# Patient Record
Sex: FEMALE | Race: BLACK OR AFRICAN AMERICAN | NOT HISPANIC OR LATINO | URBAN - METROPOLITAN AREA
[De-identification: names, ages, dates, MRNs, and addresses within clinical notes are randomized per-mention and may not be internally consistent; named-entity substitution may affect disease eponyms.]

---

## 2017-07-24 ENCOUNTER — EMERGENCY (EMERGENCY)
Facility: HOSPITAL | Age: 42
LOS: 1 days | Discharge: HOME | End: 2017-07-24
Attending: EMERGENCY MEDICINE | Admitting: EMERGENCY MEDICINE
Payer: COMMERCIAL

## 2017-07-24 VITALS
OXYGEN SATURATION: 98 % | SYSTOLIC BLOOD PRESSURE: 129 MMHG | HEART RATE: 101 BPM | TEMPERATURE: 98 F | RESPIRATION RATE: 16 BRPM | DIASTOLIC BLOOD PRESSURE: 82 MMHG

## 2017-07-24 PROCEDURE — 10060 I&D ABSCESS SIMPLE/SINGLE: CPT

## 2017-07-24 PROCEDURE — 87070 CULTURE OTHR SPECIMN AEROBIC: CPT

## 2017-07-24 PROCEDURE — 87186 SC STD MICRODIL/AGAR DIL: CPT

## 2017-07-24 PROCEDURE — 99283 EMERGENCY DEPT VISIT LOW MDM: CPT | Mod: 25

## 2017-07-24 RX ORDER — CEPHALEXIN 500 MG
1 CAPSULE ORAL
Qty: 14 | Refills: 0 | OUTPATIENT
Start: 2017-07-24 | End: 2017-07-31

## 2017-07-24 NOTE — ED PROVIDER NOTE - CARE PLAN
Principal Discharge DX:	Abscess  Instructions for follow-up, activity and diet:	Follow up with your Primary Care Doctor within the next 2 days.  Keep covered and dry.  Take Keflex 500mg twice daily for 7 days.  Any increased pain, redness, fever, chills return to ED

## 2017-07-24 NOTE — ED PROVIDER NOTE - ATTENDING CONTRIBUTION TO CARE
42 F w anemia, diabetes mellitus presents w complaint of right mid back possible abscess and tenderness for the past 3-4 days. abscess noted on us 1x1 cm with induration to I and d and d.c home with abx.

## 2017-07-24 NOTE — ED ADULT NURSE NOTE - OBJECTIVE STATEMENT
41 yo F presents to ED A+Ox3 c/o pain and wound to right lower back. States she "thought it was a bug bite."  Small red area with purulent area noted to right lower back. No active drainage noted. Denies fever, chills, cough. Breathing unlabored on RA.

## 2017-07-24 NOTE — ED PROVIDER NOTE - SKIN COLOR
right midback 3 cm x 4cm firm area of induration  with 2mm central pustule.  the area is hot and tender

## 2017-07-24 NOTE — ED PROVIDER NOTE - OBJECTIVE STATEMENT
42 F w anemia, diabetes mellitus presents w complaint of right mid back possible abscess and tenderness for the past 3-4 days. She has not been able to see the spot on her back but suspects it is an abscess. She denies bug bite, or trauma to the area prior to noticing it. She denies fever, chills or drainage from the site.

## 2017-07-24 NOTE — ED PROVIDER NOTE - PLAN OF CARE
Follow up with your Primary Care Doctor within the next 2 days.  Keep covered and dry.  Take Keflex 500mg twice daily for 7 days.  Any increased pain, redness, fever, chills return to ED

## 2017-07-24 NOTE — ED ADULT NURSE NOTE - PSH
C section  2008  No significant past surgical history    S/P laparoscopic procedure  D&C Hysteroscopy, Laparoscopy, RSO/ Excision Right Tubo-ovarian abscess 7/19

## 2017-07-26 LAB
-  AMPICILLIN/SULBACTAM: SIGNIFICANT CHANGE UP
-  CEFAZOLIN: SIGNIFICANT CHANGE UP
-  CIPROFLOXACIN: SIGNIFICANT CHANGE UP
-  CLINDAMYCIN: SIGNIFICANT CHANGE UP
-  DAPTOMYCIN: SIGNIFICANT CHANGE UP
-  ERYTHROMYCIN: SIGNIFICANT CHANGE UP
-  GENTAMICIN: SIGNIFICANT CHANGE UP
-  LEVOFLOXACIN: SIGNIFICANT CHANGE UP
-  LINEZOLID: SIGNIFICANT CHANGE UP
-  MOXIFLOXACIN(AEROBIC): SIGNIFICANT CHANGE UP
-  OXACILLIN: SIGNIFICANT CHANGE UP
-  PENICILLIN: SIGNIFICANT CHANGE UP
-  RIFAMPIN: SIGNIFICANT CHANGE UP
-  TETRACYCLINE: SIGNIFICANT CHANGE UP
-  TRIMETHOPRIM/SULFAMETHOXAZOLE: SIGNIFICANT CHANGE UP
-  VANCOMYCIN: SIGNIFICANT CHANGE UP
CULTURE RESULTS: SIGNIFICANT CHANGE UP
METHOD TYPE: SIGNIFICANT CHANGE UP
ORGANISM # SPEC MICROSCOPIC CNT: SIGNIFICANT CHANGE UP
ORGANISM # SPEC MICROSCOPIC CNT: SIGNIFICANT CHANGE UP
SPECIMEN SOURCE: SIGNIFICANT CHANGE UP

## 2017-10-21 ENCOUNTER — INPATIENT (INPATIENT)
Facility: HOSPITAL | Age: 42
LOS: 5 days | Discharge: HOME | DRG: 871 | End: 2017-10-27
Attending: INTERNAL MEDICINE | Admitting: SPECIALIST
Payer: COMMERCIAL

## 2017-10-21 VITALS
SYSTOLIC BLOOD PRESSURE: 155 MMHG | HEIGHT: 62 IN | OXYGEN SATURATION: 94 % | DIASTOLIC BLOOD PRESSURE: 79 MMHG | HEART RATE: 131 BPM | RESPIRATION RATE: 28 BRPM | WEIGHT: 173.94 LBS

## 2017-10-21 DIAGNOSIS — E13.10 OTHER SPECIFIED DIABETES MELLITUS WITH KETOACIDOSIS WITHOUT COMA: ICD-10-CM

## 2017-10-21 DIAGNOSIS — E87.8 OTHER DISORDERS OF ELECTROLYTE AND FLUID BALANCE, NOT ELSEWHERE CLASSIFIED: ICD-10-CM

## 2017-10-21 DIAGNOSIS — I82.409 ACUTE EMBOLISM AND THROMBOSIS OF UNSPECIFIED DEEP VEINS OF UNSPECIFIED LOWER EXTREMITY: ICD-10-CM

## 2017-10-21 DIAGNOSIS — R10.9 UNSPECIFIED ABDOMINAL PAIN: ICD-10-CM

## 2017-10-21 DIAGNOSIS — I24.9 ACUTE ISCHEMIC HEART DISEASE, UNSPECIFIED: ICD-10-CM

## 2017-10-21 LAB
ACETONE SERPL-MCNC: ABNORMAL
ACETONE SERPL-MCNC: ABNORMAL
ALBUMIN SERPL ELPH-MCNC: 3.9 G/DL — SIGNIFICANT CHANGE UP (ref 3.3–5)
ALP SERPL-CCNC: 172 U/L — HIGH (ref 40–120)
ALT FLD-CCNC: 16 U/L RC — SIGNIFICANT CHANGE UP (ref 10–45)
AMPHET UR-MCNC: NEGATIVE — SIGNIFICANT CHANGE UP
ANION GAP SERPL CALC-SCNC: 26 MMOL/L — HIGH (ref 5–17)
ANION GAP SERPL CALC-SCNC: 31 MMOL/L — HIGH (ref 5–17)
APPEARANCE UR: CLEAR — SIGNIFICANT CHANGE UP
APTT BLD: 34.3 SEC — SIGNIFICANT CHANGE UP (ref 27.5–37.4)
AST SERPL-CCNC: 14 U/L — SIGNIFICANT CHANGE UP (ref 10–40)
B-OH-BUTYR SERPL-SCNC: 7 MMOL/L — HIGH
BARBITURATES UR SCN-MCNC: NEGATIVE — SIGNIFICANT CHANGE UP
BASE EXCESS BLDV CALC-SCNC: -26.1 MMOL/L — LOW (ref -2–2)
BASE EXCESS BLDV CALC-SCNC: -27.7 MMOL/L — LOW (ref -2–2)
BASOPHILS # BLD AUTO: 0 K/UL — SIGNIFICANT CHANGE UP (ref 0–0.2)
BENZODIAZ UR-MCNC: NEGATIVE — SIGNIFICANT CHANGE UP
BILIRUB SERPL-MCNC: 0.2 MG/DL — SIGNIFICANT CHANGE UP (ref 0.2–1.2)
BILIRUB UR-MCNC: NEGATIVE — SIGNIFICANT CHANGE UP
BUN SERPL-MCNC: 16 MG/DL — SIGNIFICANT CHANGE UP (ref 7–23)
BUN SERPL-MCNC: 17 MG/DL — SIGNIFICANT CHANGE UP (ref 7–23)
CA-I SERPL-SCNC: 1.32 MMOL/L — HIGH (ref 1.12–1.3)
CA-I SERPL-SCNC: 1.33 MMOL/L — HIGH (ref 1.12–1.3)
CALCIUM SERPL-MCNC: 8.6 MG/DL — SIGNIFICANT CHANGE UP (ref 8.4–10.5)
CALCIUM SERPL-MCNC: 9.2 MG/DL — SIGNIFICANT CHANGE UP (ref 8.4–10.5)
CHLORIDE BLDV-SCNC: 101 MMOL/L — SIGNIFICANT CHANGE UP (ref 96–108)
CHLORIDE BLDV-SCNC: 110 MMOL/L — HIGH (ref 96–108)
CHLORIDE SERPL-SCNC: 101 MMOL/L — SIGNIFICANT CHANGE UP (ref 96–108)
CHLORIDE SERPL-SCNC: 86 MMOL/L — LOW (ref 96–108)
CK MB BLD-MCNC: 2.8 % — SIGNIFICANT CHANGE UP (ref 0–3.5)
CK MB CFR SERPL CALC: 1.5 NG/ML — SIGNIFICANT CHANGE UP (ref 0–3.8)
CK SERPL-CCNC: 54 U/L — SIGNIFICANT CHANGE UP (ref 25–170)
CO2 BLDV-SCNC: 5 MMOL/L — LOW (ref 22–30)
CO2 BLDV-SCNC: 7 MMOL/L — LOW (ref 22–30)
CO2 SERPL-SCNC: 4 MMOL/L — CRITICAL LOW (ref 22–31)
CO2 SERPL-SCNC: 5 MMOL/L — CRITICAL LOW (ref 22–31)
COCAINE METAB.OTHER UR-MCNC: NEGATIVE — SIGNIFICANT CHANGE UP
COLOR SPEC: COLORLESS — SIGNIFICANT CHANGE UP
CREAT SERPL-MCNC: 1.03 MG/DL — SIGNIFICANT CHANGE UP (ref 0.5–1.3)
CREAT SERPL-MCNC: 1.27 MG/DL — SIGNIFICANT CHANGE UP (ref 0.5–1.3)
DIFF PNL FLD: ABNORMAL
EOSINOPHIL # BLD AUTO: 0.1 K/UL — SIGNIFICANT CHANGE UP (ref 0–0.5)
EOSINOPHIL NFR BLD AUTO: 1 % — SIGNIFICANT CHANGE UP (ref 0–6)
ETHANOL SERPL-MCNC: SIGNIFICANT CHANGE UP MG/DL (ref 0–10)
GAS PNL BLDV: 124 MMOL/L — LOW (ref 136–145)
GAS PNL BLDV: 134 MMOL/L — LOW (ref 136–145)
GAS PNL BLDV: SIGNIFICANT CHANGE UP
GIANT PLATELETS BLD QL SMEAR: PRESENT — SIGNIFICANT CHANGE UP
GLUCOSE BLDC GLUCOMTR-MCNC: 267 MG/DL — HIGH (ref 70–99)
GLUCOSE BLDC GLUCOMTR-MCNC: 367 MG/DL — HIGH (ref 70–99)
GLUCOSE BLDC GLUCOMTR-MCNC: 486 MG/DL — CRITICAL HIGH (ref 70–99)
GLUCOSE BLDC GLUCOMTR-MCNC: >600 MG/DL — CRITICAL HIGH (ref 70–99)
GLUCOSE BLDV-MCNC: 655 MG/DL — CRITICAL HIGH (ref 70–99)
GLUCOSE BLDV-MCNC: 977 MG/DL — CRITICAL HIGH (ref 70–99)
GLUCOSE SERPL-MCNC: 712 MG/DL — CRITICAL HIGH (ref 70–99)
GLUCOSE SERPL-MCNC: 987 MG/DL — CRITICAL HIGH (ref 70–99)
GLUCOSE UR QL: >1000
HCG UR QL: NEGATIVE — SIGNIFICANT CHANGE UP
HCO3 BLDV-SCNC: 4 MMOL/L — LOW (ref 21–29)
HCO3 BLDV-SCNC: 6 MMOL/L — LOW (ref 21–29)
HCT VFR BLD CALC: 39 % — SIGNIFICANT CHANGE UP (ref 34.5–45)
HCT VFR BLD CALC: 45.4 % — HIGH (ref 34.5–45)
HCT VFR BLDA CALC: 36 % — LOW (ref 39–50)
HCT VFR BLDA CALC: 40 % — SIGNIFICANT CHANGE UP (ref 39–50)
HGB BLD CALC-MCNC: 11.7 G/DL — SIGNIFICANT CHANGE UP (ref 11.5–15.5)
HGB BLD CALC-MCNC: 12.9 G/DL — SIGNIFICANT CHANGE UP (ref 11.5–15.5)
HGB BLD-MCNC: 11.9 G/DL — SIGNIFICANT CHANGE UP (ref 11.5–15.5)
HGB BLD-MCNC: 13 G/DL — SIGNIFICANT CHANGE UP (ref 11.5–15.5)
INR BLD: 1.2 RATIO — HIGH (ref 0.88–1.16)
KETONES UR-MCNC: ABNORMAL
LACTATE BLDV-MCNC: 2.2 MMOL/L — HIGH (ref 0.7–2)
LACTATE BLDV-MCNC: 2.9 MMOL/L — HIGH (ref 0.7–2)
LEUKOCYTE ESTERASE UR-ACNC: NEGATIVE — SIGNIFICANT CHANGE UP
LYMPHOCYTES # BLD AUTO: 1 K/UL — SIGNIFICANT CHANGE UP (ref 1–3.3)
LYMPHOCYTES # BLD AUTO: 3 % — LOW (ref 13–44)
MAGNESIUM SERPL-MCNC: 2.2 MG/DL — SIGNIFICANT CHANGE UP (ref 1.6–2.6)
MCHC RBC-ENTMCNC: 27.9 PG — SIGNIFICANT CHANGE UP (ref 27–34)
MCHC RBC-ENTMCNC: 28.5 PG — SIGNIFICANT CHANGE UP (ref 27–34)
MCHC RBC-ENTMCNC: 28.7 GM/DL — LOW (ref 32–36)
MCHC RBC-ENTMCNC: 30.5 GM/DL — LOW (ref 32–36)
MCV RBC AUTO: 93.5 FL — SIGNIFICANT CHANGE UP (ref 80–100)
MCV RBC AUTO: 97.1 FL — SIGNIFICANT CHANGE UP (ref 80–100)
METHADONE UR-MCNC: NEGATIVE — SIGNIFICANT CHANGE UP
MONOCYTES # BLD AUTO: 1.5 K/UL — HIGH (ref 0–0.9)
MONOCYTES NFR BLD AUTO: 6 % — SIGNIFICANT CHANGE UP (ref 2–14)
MYELOCYTES NFR BLD: 1 % — HIGH (ref 0–0)
NEUTROPHILS # BLD AUTO: 20.7 K/UL — HIGH (ref 1.8–7.4)
NEUTROPHILS NFR BLD AUTO: 86 % — HIGH (ref 43–77)
NEUTS BAND # BLD: 3 % — SIGNIFICANT CHANGE UP (ref 0–8)
NITRITE UR-MCNC: NEGATIVE — SIGNIFICANT CHANGE UP
OPIATES UR-MCNC: NEGATIVE — SIGNIFICANT CHANGE UP
OTHER CELLS CSF MANUAL: 13 ML/DL — LOW (ref 18–22)
OTHER CELLS CSF MANUAL: 4 ML/DL — LOW (ref 18–22)
OXYCODONE UR-MCNC: NEGATIVE — SIGNIFICANT CHANGE UP
PCO2 BLDV: 23 MMHG — LOW (ref 35–50)
PCO2 BLDV: 32 MMHG — LOW (ref 35–50)
PCP SPEC-MCNC: SIGNIFICANT CHANGE UP
PCP UR-MCNC: NEGATIVE — SIGNIFICANT CHANGE UP
PH BLDV: 6.91 — CRITICAL LOW (ref 7.35–7.45)
PH BLDV: 6.93 — CRITICAL LOW (ref 7.35–7.45)
PH UR: 5.5 — SIGNIFICANT CHANGE UP (ref 5–8)
PHOSPHATE SERPL-MCNC: 1.8 MG/DL — LOW (ref 2.5–4.5)
PLAT MORPH BLD: ABNORMAL
PLATELET # BLD AUTO: 347 K/UL — SIGNIFICANT CHANGE UP (ref 150–400)
PLATELET # BLD AUTO: 361 K/UL — SIGNIFICANT CHANGE UP (ref 150–400)
PO2 BLDV: 19 MMHG — LOW (ref 25–45)
PO2 BLDV: 48 MMHG — HIGH (ref 25–45)
POTASSIUM BLDV-SCNC: 4.2 MMOL/L — SIGNIFICANT CHANGE UP (ref 3.5–5)
POTASSIUM BLDV-SCNC: 5.6 MMOL/L — HIGH (ref 3.5–5)
POTASSIUM SERPL-MCNC: 4.3 MMOL/L — SIGNIFICANT CHANGE UP (ref 3.5–5.3)
POTASSIUM SERPL-MCNC: 5.7 MMOL/L — HIGH (ref 3.5–5.3)
POTASSIUM SERPL-SCNC: 4.3 MMOL/L — SIGNIFICANT CHANGE UP (ref 3.5–5.3)
POTASSIUM SERPL-SCNC: 5.7 MMOL/L — HIGH (ref 3.5–5.3)
PROCALCITONIN SERPL-MCNC: 3.4 NG/ML — HIGH (ref 0–0.04)
PROT SERPL-MCNC: 9.4 G/DL — HIGH (ref 6–8.3)
PROT UR-MCNC: 30 MG/DL
PROTHROM AB SERPL-ACNC: 13 SEC — HIGH (ref 9.8–12.7)
RBC # BLD: 4.18 M/UL — SIGNIFICANT CHANGE UP (ref 3.8–5.2)
RBC # BLD: 4.67 M/UL — SIGNIFICANT CHANGE UP (ref 3.8–5.2)
RBC # FLD: 12 % — SIGNIFICANT CHANGE UP (ref 10.3–14.5)
RBC # FLD: 12.2 % — SIGNIFICANT CHANGE UP (ref 10.3–14.5)
RBC BLD AUTO: SIGNIFICANT CHANGE UP
RBC CASTS # UR COMP ASSIST: SIGNIFICANT CHANGE UP /HPF (ref 0–2)
SAO2 % BLDV: 24 % — LOW (ref 67–88)
SAO2 % BLDV: 73 % — SIGNIFICANT CHANGE UP (ref 67–88)
SODIUM SERPL-SCNC: 121 MMOL/L — LOW (ref 135–145)
SODIUM SERPL-SCNC: 132 MMOL/L — LOW (ref 135–145)
SP GR SPEC: 1.02 — SIGNIFICANT CHANGE UP (ref 1.01–1.02)
THC UR QL: NEGATIVE — SIGNIFICANT CHANGE UP
TROPONIN T SERPL-MCNC: <0.01 NG/ML — SIGNIFICANT CHANGE UP (ref 0–0.06)
UROBILINOGEN FLD QL: NEGATIVE — SIGNIFICANT CHANGE UP
WBC # BLD: 23.4 K/UL — HIGH (ref 3.8–10.5)
WBC # BLD: 26.8 K/UL — HIGH (ref 3.8–10.5)
WBC # FLD AUTO: 23.4 K/UL — HIGH (ref 3.8–10.5)
WBC # FLD AUTO: 26.8 K/UL — HIGH (ref 3.8–10.5)
WBC UR QL: SIGNIFICANT CHANGE UP /HPF (ref 0–5)

## 2017-10-21 PROCEDURE — 99291 CRITICAL CARE FIRST HOUR: CPT

## 2017-10-21 PROCEDURE — 93010 ELECTROCARDIOGRAM REPORT: CPT

## 2017-10-21 PROCEDURE — 71010: CPT | Mod: 26

## 2017-10-21 PROCEDURE — 99291 CRITICAL CARE FIRST HOUR: CPT | Mod: 25

## 2017-10-21 PROCEDURE — 74000: CPT | Mod: 26

## 2017-10-21 RX ORDER — ONDANSETRON 8 MG/1
4 TABLET, FILM COATED ORAL ONCE
Qty: 0 | Refills: 0 | Status: COMPLETED | OUTPATIENT
Start: 2017-10-21 | End: 2017-10-21

## 2017-10-21 RX ORDER — INSULIN HUMAN 100 [IU]/ML
8 INJECTION, SOLUTION SUBCUTANEOUS ONCE
Qty: 0 | Refills: 0 | Status: COMPLETED | OUTPATIENT
Start: 2017-10-21 | End: 2017-10-21

## 2017-10-21 RX ORDER — POLYETHYLENE GLYCOL 3350 17 G/17G
17 POWDER, FOR SOLUTION ORAL DAILY
Qty: 0 | Refills: 0 | Status: DISCONTINUED | OUTPATIENT
Start: 2017-10-21 | End: 2017-10-27

## 2017-10-21 RX ORDER — INSULIN HUMAN 100 [IU]/ML
4 INJECTION, SOLUTION SUBCUTANEOUS
Qty: 100 | Refills: 0 | Status: DISCONTINUED | OUTPATIENT
Start: 2017-10-21 | End: 2017-10-22

## 2017-10-21 RX ORDER — INSULIN HUMAN 100 [IU]/ML
10 INJECTION, SOLUTION SUBCUTANEOUS ONCE
Qty: 0 | Refills: 0 | Status: COMPLETED | OUTPATIENT
Start: 2017-10-21 | End: 2017-10-21

## 2017-10-21 RX ORDER — SODIUM CHLORIDE 9 MG/ML
1000 INJECTION INTRAMUSCULAR; INTRAVENOUS; SUBCUTANEOUS
Qty: 0 | Refills: 0 | Status: DISCONTINUED | OUTPATIENT
Start: 2017-10-21 | End: 2017-10-22

## 2017-10-21 RX ORDER — SODIUM CHLORIDE 9 MG/ML
1000 INJECTION, SOLUTION INTRAVENOUS
Qty: 0 | Refills: 0 | Status: COMPLETED | OUTPATIENT
Start: 2017-10-21 | End: 2017-10-21

## 2017-10-21 RX ORDER — SENNA PLUS 8.6 MG/1
2 TABLET ORAL AT BEDTIME
Qty: 0 | Refills: 0 | Status: DISCONTINUED | OUTPATIENT
Start: 2017-10-21 | End: 2017-10-27

## 2017-10-21 RX ORDER — SODIUM CHLORIDE 9 MG/ML
2000 INJECTION INTRAMUSCULAR; INTRAVENOUS; SUBCUTANEOUS ONCE
Qty: 0 | Refills: 0 | Status: COMPLETED | OUTPATIENT
Start: 2017-10-21 | End: 2017-10-21

## 2017-10-21 RX ORDER — PANTOPRAZOLE SODIUM 20 MG/1
40 TABLET, DELAYED RELEASE ORAL DAILY
Qty: 0 | Refills: 0 | Status: DISCONTINUED | OUTPATIENT
Start: 2017-10-21 | End: 2017-10-22

## 2017-10-21 RX ORDER — HEPARIN SODIUM 5000 [USP'U]/ML
5000 INJECTION INTRAVENOUS; SUBCUTANEOUS EVERY 8 HOURS
Qty: 0 | Refills: 0 | Status: DISCONTINUED | OUTPATIENT
Start: 2017-10-21 | End: 2017-10-27

## 2017-10-21 RX ORDER — SIMETHICONE 80 MG/1
80 TABLET, CHEWABLE ORAL EVERY 4 HOURS
Qty: 0 | Refills: 0 | Status: DISCONTINUED | OUTPATIENT
Start: 2017-10-21 | End: 2017-10-27

## 2017-10-21 RX ADMIN — Medication 255 MILLIMOLE(S): at 23:55

## 2017-10-21 RX ADMIN — INSULIN HUMAN 10 UNIT(S): 100 INJECTION, SOLUTION SUBCUTANEOUS at 21:00

## 2017-10-21 RX ADMIN — INSULIN HUMAN 8 UNIT(S)/HR: 100 INJECTION, SOLUTION SUBCUTANEOUS at 19:57

## 2017-10-21 RX ADMIN — ONDANSETRON 4 MILLIGRAM(S): 8 TABLET, FILM COATED ORAL at 19:09

## 2017-10-21 RX ADMIN — INSULIN HUMAN 8 UNIT(S): 100 INJECTION, SOLUTION SUBCUTANEOUS at 19:37

## 2017-10-21 RX ADMIN — SODIUM CHLORIDE 500 MILLILITER(S): 9 INJECTION INTRAMUSCULAR; INTRAVENOUS; SUBCUTANEOUS at 20:02

## 2017-10-21 RX ADMIN — SODIUM CHLORIDE 1000 MILLILITER(S): 9 INJECTION INTRAMUSCULAR; INTRAVENOUS; SUBCUTANEOUS at 19:04

## 2017-10-21 RX ADMIN — SENNA PLUS 2 TABLET(S): 8.6 TABLET ORAL at 23:59

## 2017-10-21 RX ADMIN — SODIUM CHLORIDE 500 MILLILITER(S): 9 INJECTION INTRAMUSCULAR; INTRAVENOUS; SUBCUTANEOUS at 23:57

## 2017-10-21 RX ADMIN — INSULIN HUMAN 10 UNIT(S)/HR: 100 INJECTION, SOLUTION SUBCUTANEOUS at 23:55

## 2017-10-21 RX ADMIN — SODIUM CHLORIDE 125 MILLILITER(S): 9 INJECTION, SOLUTION INTRAVENOUS at 22:45

## 2017-10-21 NOTE — H&P ADULT - NEUROLOGICAL
Supine upright views of the abdomen:



Abdominal pain.



There is no unremarkable distribution of bowel gas. In the upright 

position a couple of scattered air-fluid levels are identified in the 

mid and lower abdomen. No free air. No soft tissue mass. No abnormal 

soft tissue calcifications. Of incidental note appears to be slight 

narrowing of the superior left acetabular joint space with articular 

sclerosis of the acetabulum and small lateral spur.



Impressions:



1. Nonspecific minimal small bowel ileus.



2. Degenerative left hip changes. details… detailed exam

## 2017-10-21 NOTE — ED ADULT NURSE NOTE - HARM RISK FACTORS
[address] [facility] [address] 
[phone] Work/School Note Date: 9/21/2017 To Whom It May concern: 
 
Ame Ahn was seen and treated today in the emergency room by the following provider(s): 
Attending Provider: Destin Hampton MD 
Physician Assistant: Pedro Cheema. Zachary Pinedo may return to work on 09/25/17. Sincerely, Pedro Cheema 
 
 
 
 no

## 2017-10-21 NOTE — ED ADULT NURSE NOTE - PRIMARY CARE PROVIDER
Problem: Goal Outcome Summary  Goal: Goal Outcome Summary  Outcome: Improving  Tolerating clear liquids well, small amount of liquid stool and gas in ostomy bag this am. Up in zhang with SBA. PCA Dilaudid effective for pain.       unk

## 2017-10-21 NOTE — ED PROVIDER NOTE - OBJECTIVE STATEMENT
42 y.o. female hx of DM on insulin, lantas and humalog pw SOB worsening over past couple of days. Pt states that she just got off a plane from [place] and while there was not monitoring her blood glucose and is not taking insulin completely as prescribed. Also endorses chest discomfort radiating to back. +generalized malaise and weakness. No fevers, chills or recent illness.

## 2017-10-21 NOTE — ED ADULT NURSE REASSESSMENT NOTE - NS ED NURSE REASSESS COMMENT FT1
Report received from LAWRENCE Alejandra.  Patient awake and alert and oriented x 4.  Family at the bedside.  Patient c/o nausea and had some vomiting, but not actively vomiting at this time.  Patient and family aware of plan of care.  Safety ensured.
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
## 2017-10-21 NOTE — ED PROVIDER NOTE - MEDICAL DECISION MAKING DETAILS
42 y.o. female pw likely DKA. FS >600. Likely due to poor compliance with meds. Will check labs, fluids, likely insulin gtt and ICU admit.
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
## 2017-10-21 NOTE — ED PROVIDER NOTE - INTERPRETATION
EKG reviewed for rate, rhythm, axis, intervals and segments, including QRS morphology, P wave appearance T wave appearance, KS interval, and QT interval.  I find the EKG to be unremarkable in all of these regards except as follows: sinus tachycardia

## 2017-10-21 NOTE — H&P ADULT - PROBLEM SELECTOR PLAN 1
DKA in setting of her non compliance with her insulin regimen . will also r/o other causes such as infectious process   Admit to MICU   c/w insulin gtt   c/w FS q 1 hr   c/w  BMP, MG, Phos, VBG w/ lytes , lactate, acetones q4 hrs  check procalcitonin, PAN cx, UA, legionella  vital signs monitoring   endocrine consult DKA in setting of her non compliance with her insulin regimen . will also r/o other causes such as infectious process , toxicology source   Admit to MICU   c/w insulin gtt   c/w FS q 1 hr   c/w  BMP, MG, Phos, VBG w/ lytes , lactate, acetones q4 hrs  CXR  check procalcitonin, PAN cx, UA, legionella, urine tox , ETOH level   IVF hydration   vital signs monitoring   endocrine consult  NPO   strict I&o's DKA in setting of her non compliance with her insulin regimen . will also r/o other causes such as infectious process , toxicology source   Admit to MICU   c/w insulin gtt   c/w FS q 1 hr   c/w  BMP, MG, Phos, VBG w/ lytes , lactate, acetones q4 hrs  CXR  check procalcitonin, PAN cx, UA, legionella, urine tox , ETOH level , Hgba1c, lipid   IVF hydration   vital signs monitoring   endocrine consult  NPO   strict I&o's

## 2017-10-21 NOTE — ED PROVIDER NOTE - NS ED ROS FT
ROS: denies HA, dizziness, fevers/chills, nausea/vomiting,  diaphoresis, abdominal pain, back/neck pain, dysuria/hematuria, or rash  +SOB, weakness, chest pain

## 2017-10-21 NOTE — H&P ADULT - NEGATIVE CARDIOVASCULAR SYMPTOMS
no peripheral edema/no dyspnea on exertion/no palpitations/no paroxysmal nocturnal dyspnea/no claudication/no orthopnea

## 2017-10-21 NOTE — H&P ADULT - ASSESSMENT
43 y/o F with DMT2 p/ to ED with SOB , chest pressure found to be in DKA in setting of noncompliance with insulin regimen. Will also r/o other causes such as infectious process, ACLS, DVT. 43 y/o F with DMT2 p/ to ED with SOB , chest pressure found to be in DKA in setting of noncompliance with insulin regimen. Will also r/o other causes such as infectious process, ACLS.

## 2017-10-21 NOTE — H&P ADULT - PROBLEM SELECTOR PLAN 2
chest pressure radiating to back   trend CE  EKG   telemetry monitoring SOB & chest pressure radiating to back ; r/o ACS,  electrolytes imbalance   trend CE  EKG   telemetry monitoring  correct hyperkalemia / electrolyte imbalance  supplemental O2 PRN to maintain o2sat >94% SOB & chest pressure radiating to back ; r/o ACS,  electrolytes imbalance   trend CE  EKG   telemetry monitoring  correct hyperkalemia / electrolyte imbalance  supplemental O2 PRN to maintain o2sat >94%  obtain VA duplex  TTE

## 2017-10-21 NOTE — ED PROVIDER NOTE - ATTENDING CONTRIBUTION TO CARE
41 yo female, insulin-dependant diabetic, several days of lethargic, nausea, frequent urination, shortness of breath.  Hasn't checked sugars or taken insulin in several days, recently returned from [place].  Kussmaul respirations on exam, BS critical high, patient in DKA.  IV access, multiple liters of NS, pending labs, will begin insulin infusion, MICU consult.

## 2017-10-21 NOTE — ED PROVIDER NOTE - PHYSICAL EXAMINATION
Gen: Ill appearing, resp distress with Kussmaul breathing pattern, AOx3  Head: NCAT  HEENT: PERRL, MM dry, normal conjunctiva  Lung: CTAB, no rales, rhonchi or wheezing  CV: S1/S2, no murmurs, rubs or gallops  Abd: soft, NTND, no rebound or guarding  MSK: No CVA tenderness. No edema, no visible deformities  Neuro: No focal neurologic deficits. CN II-XII intact. Normal strength and sensation x 4  Skin: Warm and dry, no evidence of rash  Psych: normal mood and affect

## 2017-10-21 NOTE — ED ADULT NURSE NOTE - OBJECTIVE STATEMENT
42 y.o female c c/o SOB/ and not feeling well since Thursday. Family states they were away in [place] on vacation. On Thursday pt wasn't feeling well and was having episodes of confusion. Pt wasn't doing FS. Pt is on insulin. Dry mucous membranes. Unable to obtain FS- machine reading "high". VBG drawn/sent. Pt was drinking a lot of juice in [facility]. Tachycardic to 120bpm range. Pt has been vomiting for past 2days. Pt took a laxative but wasn't able to produce a bowel movement. IV access obtained. Pt receiving fluids.

## 2017-10-21 NOTE — H&P ADULT - NSHPSOCIALHISTORY_GEN_ALL_CORE
Martial ;   Living :   Tobacco:  Alcohol:  Drug use:  Sexual history; Martial ;    Living : spouse and kids   Tobacco: denies  Alcohol: denies  Drug use: denies

## 2017-10-21 NOTE — H&P ADULT - HISTORY OF PRESENT ILLNESS
42 y.o. female hx of DMT2 on Insulin on Lantus and Humalog, back abscess with MRSA 7/2017,   p/w SOB worsening over past couple of days. Pt states that she just got off a plane from [place] and while there was not monitoring her blood glucose and is not taking insulin completely as prescribed. Also endorses chest discomfort radiating to back.   +generalized malaise, fatigue and weakness. No fevers, chills or recent illness.  ICU Vital Signs Last 24 Hrs  T(C): 36.4   HR;118 - 131  BP: 147/80 - 172/96  RR: 18 - 28  SpO2: 94% - 100%                          13.0   23.4  )-----------( 361      ( 21 Oct 2017 18:39 )             45.4   10-21    121<L>  |  86<L>  |  17  ----------------------------<  987<HH>  5.7<H>   |  4<LL>  |  1.27    Ca    9.2      21 Oct 2017 18:39  Phos  3.4     10-21  Mg     2.5     10-21    TPro  9.4<H>  /  Alb  3.9  /  TBili  0.2  /  DBili  x   /  AST  14  /  ALT  16  /  AlkPhos  172<H>  10-21    PT/INR - ( 21 Oct 2017 18:39 )   PT: 13.0 sec;   INR: 1.20 ratio      PTT - ( 21 Oct 2017 18:39 )  PTT:34.3 sec 42 y.o. female hx of DMT2 on Insulin on Lantus and Humalog, back abscess with MRSA 7/2017,   p/w SOB worsening over past couple of days. Pt states that she just got off a plane from [place] and while there was not monitoring her blood glucose and is not taking insulin completely as prescribed.   Also endorses chest discomfort radiating to back, generalized malaise, fatigue and weakness. No fevers, chills or recent illness.  ICU Vital Signs Last 24 Hrs  T(C): 36.4   HR;118 - 131  BP: 147/80 - 172/96  RR: 18 - 28  SpO2: 94% - 100%                          13.0   23.4  )-----------( 361      ( 21 Oct 2017 18:39 )             45.4   10-21    121<L>  |  86<L>  |  17  ----------------------------<  987<HH>  5.7<H>   |  4<LL>  |  1.27    Ca    9.2      21 Oct 2017 18:39  Phos  3.4     10-21  Mg     2.5     10-21    TPro  9.4<H>  /  Alb  3.9  /  TBili  0.2  /  DBili  x   /  AST  14  /  ALT  16  /  AlkPhos  172<H>  10-21    PT/INR - ( 21 Oct 2017 18:39 )   PT: 13.0 sec;   INR: 1.20 ratio      PTT - ( 21 Oct 2017 18:39 )  PTT:34.3 sec 42 y.o. female hx of DMT2 on Insulin on Lantus and Humalog, back abscess with MRSA 7/2017,   p/w SOB worsening over past couple of days. Pt states that she just got off a plane from [place] and while there was not monitoring her blood glucose and is not taking insulin completely as prescribed.   Also endorses chest discomfort radiating to back, generalized malaise, fatigue and weakness. No fevers, chills or recent illness.  In ED received 2 L NS, regular insulin 8 units IVP x 1, and than started on insulin gtt .    ICU Vital Signs Last 24 Hrs  T(C): 36.4   HR;118 - 131  BP: 147/80 - 172/96  RR: 18 - 28  SpO2: 94% - 100%                          13.0   23.4  )-----------( 361      ( 21 Oct 2017 18:39 )             45.4   10-21    121<L>  |  86<L>  |  17  ----------------------------<  987<HH>  5.7<H>   |  4<LL>  |  1.27    Ca    9.2      21 Oct 2017 18:39  Phos  3.4     10-21  Mg     2.5     10-21    TPro  9.4<H>  /  Alb  3.9  /  TBili  0.2  /  DBili  x   /  AST  14  /  ALT  16  /  AlkPhos  172<H>  10-21    PT/INR - ( 21 Oct 2017 18:39 )   PT: 13.0 sec;   INR: 1.20 ratio      PTT - ( 21 Oct 2017 18:39 )  PTT:34.3 sec 42 y.o. female hx of DMT2 on Insulin on Lantus and Humalog, back abscess with MRSA 7/2017,   p/w SOB worsening over past couple of days. Pt states that she just got off a plane from [place] and while there was not monitoring her blood glucose and is not taking insulin completely as prescribed.   Also endorses chest discomfort radiating to back, constipation, anorexia , generalized malaise, fatigue and weakness. No fevers, chills or recent illness.  In ED received 2 L NS, regular insulin 8 units IVP x 1, and than started on insulin gtt .    ICU Vital Signs Last 24 Hrs  T(C): 36.4   HR;118 - 131  BP: 147/80 - 172/96  RR: 18 - 28  SpO2: 94% - 100%                          13.0   23.4  )-----------( 361      ( 21 Oct 2017 18:39 )             45.4   10-21    121<L>  |  86<L>  |  17  ----------------------------<  987<HH>  5.7<H>   |  4<LL>  |  1.27    Ca    9.2      21 Oct 2017 18:39  Phos  3.4     10-21  Mg     2.5     10-21    TPro  9.4<H>  /  Alb  3.9  /  TBili  0.2  /  DBili  x   /  AST  14  /  ALT  16  /  AlkPhos  172<H>  10-21    PT/INR - ( 21 Oct 2017 18:39 )   PT: 13.0 sec;   INR: 1.20 ratio      PTT - ( 21 Oct 2017 18:39 )  PTT:34.3 sec 42 y.o. female hx of DMT2 on Insulin on Lantus and Humalog, back abscess with MRSA 7/2017, p/for SOB that worsening over past couple of days, chest pressure radiating to back, abdominal pain, constipation, was found to be in DKA.   Pt states that she just got off the plane from [place] where she visited family for family party. Reports that she was very active walking around, had developed anorexia and abdominal pain , has not been taking her insulin. Also endorses chest discomfort radiating to back, constipation, anorexia , generalized malaise, fatigue and weakness. No fevers, chills or recent illness.  In ED received 2 L NS, regular insulin 8 units IVP x 1, and than started on insulin gtt .  s  ICU Vital Signs Last 24 Hrs  T(C): 36.4   HR;118 - 131  BP: 147/80 - 172/96  RR: 18 - 28  SpO2: 94% - 100%                          13.0   23.4  )-----------( 361      ( 21 Oct 2017 18:39 )             45.4   10-21    121<L>  |  86<L>  |  17  ----------------------------<  987<HH>  5.7<H>   |  4<LL>  |  1.27    Ca    9.2      21 Oct 2017 18:39  Phos  3.4     10-21  Mg     2.5     10-21    TPro  9.4<H>  /  Alb  3.9  /  TBili  0.2  /  DBili  x   /  AST  14  /  ALT  16  /  AlkPhos  172<H>  10-21    PT/INR - ( 21 Oct 2017 18:39 )   PT: 13.0 sec;   INR: 1.20 ratio      PTT - ( 21 Oct 2017 18:39 )  PTT:34.3 sec

## 2017-10-21 NOTE — H&P ADULT - PROBLEM SELECTOR PLAN 3
r/o DVT considering  recent travel and SOB r/o DVT considering  recent travel and SOB  will obtain VA duplex hyperkalemia  EKG  currently on insulin gtt ; will repeat BMP

## 2017-10-21 NOTE — ED PROVIDER NOTE - CRITICAL CARE PROVIDED
additional history taking/consultation with other physicians/consult w/ pt's family directly relating to pts condition/interpretation of diagnostic studies/documentation/direct patient care (not related to procedure)

## 2017-10-21 NOTE — H&P ADULT - PROBLEM SELECTOR PLAN 4
hyperkalemia  EKG  currently on insulin gtt ; will repeat BMP abdominal pain likely in setting of DKA, other possible causes constipation, r/o obstruction , r/o pregnancy  send urine pregnancy profile  frequent abdominal exams   correct DKA   abdominal xray r/o obstruction   if constipated will start on stool softeners

## 2017-10-22 LAB
ACETONE SERPL-MCNC: ABNORMAL
ACETONE SERPL-MCNC: NEGATIVE — SIGNIFICANT CHANGE UP
ACETONE SERPL-MCNC: SIGNIFICANT CHANGE UP
AMYLASE P1 CFR SERPL: 54 U/L — SIGNIFICANT CHANGE UP (ref 25–125)
ANION GAP SERPL CALC-SCNC: 13 MMOL/L — SIGNIFICANT CHANGE UP (ref 5–17)
ANION GAP SERPL CALC-SCNC: 15 MMOL/L — SIGNIFICANT CHANGE UP (ref 5–17)
ANION GAP SERPL CALC-SCNC: 17 MMOL/L — SIGNIFICANT CHANGE UP (ref 5–17)
ANION GAP SERPL CALC-SCNC: 23 MMOL/L — HIGH (ref 5–17)
BASE EXCESS BLDV CALC-SCNC: -11.1 MMOL/L — LOW (ref -2–2)
BASE EXCESS BLDV CALC-SCNC: -12.3 MMOL/L — LOW (ref -2–2)
BASE EXCESS BLDV CALC-SCNC: -12.5 MMOL/L — LOW (ref -2–2)
BASE EXCESS BLDV CALC-SCNC: -13.8 MMOL/L — LOW (ref -2–2)
BASE EXCESS BLDV CALC-SCNC: -16.9 MMOL/L — LOW (ref -2–2)
BASE EXCESS BLDV CALC-SCNC: -20.8 MMOL/L — LOW (ref -2–2)
BUN SERPL-MCNC: 10 MG/DL — SIGNIFICANT CHANGE UP (ref 7–23)
BUN SERPL-MCNC: 10 MG/DL — SIGNIFICANT CHANGE UP (ref 7–23)
BUN SERPL-MCNC: 12 MG/DL — SIGNIFICANT CHANGE UP (ref 7–23)
BUN SERPL-MCNC: 6 MG/DL — LOW (ref 7–23)
BUN SERPL-MCNC: 7 MG/DL — SIGNIFICANT CHANGE UP (ref 7–23)
BUN SERPL-MCNC: 8 MG/DL — SIGNIFICANT CHANGE UP (ref 7–23)
BUN SERPL-MCNC: 9 MG/DL — SIGNIFICANT CHANGE UP (ref 7–23)
CA-I SERPL-SCNC: 1.12 MMOL/L — SIGNIFICANT CHANGE UP (ref 1.12–1.3)
CA-I SERPL-SCNC: 1.2 MMOL/L — SIGNIFICANT CHANGE UP (ref 1.12–1.3)
CALCIUM SERPL-MCNC: 7 MG/DL — LOW (ref 8.4–10.5)
CALCIUM SERPL-MCNC: 7.2 MG/DL — LOW (ref 8.4–10.5)
CALCIUM SERPL-MCNC: 7.3 MG/DL — LOW (ref 8.4–10.5)
CALCIUM SERPL-MCNC: 7.4 MG/DL — LOW (ref 8.4–10.5)
CALCIUM SERPL-MCNC: 7.4 MG/DL — LOW (ref 8.4–10.5)
CALCIUM SERPL-MCNC: 7.5 MG/DL — LOW (ref 8.4–10.5)
CALCIUM SERPL-MCNC: 8 MG/DL — LOW (ref 8.4–10.5)
CHLORIDE BLDV-SCNC: 118 MMOL/L — HIGH (ref 96–108)
CHLORIDE BLDV-SCNC: 121 MMOL/L — HIGH (ref 96–108)
CHLORIDE SERPL-SCNC: 108 MMOL/L — SIGNIFICANT CHANGE UP (ref 96–108)
CHLORIDE SERPL-SCNC: 108 MMOL/L — SIGNIFICANT CHANGE UP (ref 96–108)
CHLORIDE SERPL-SCNC: 110 MMOL/L — HIGH (ref 96–108)
CHLORIDE SERPL-SCNC: 110 MMOL/L — HIGH (ref 96–108)
CHLORIDE SERPL-SCNC: 112 MMOL/L — HIGH (ref 96–108)
CHLORIDE SERPL-SCNC: 112 MMOL/L — HIGH (ref 96–108)
CHLORIDE SERPL-SCNC: 113 MMOL/L — HIGH (ref 96–108)
CHOLEST SERPL-MCNC: 178 MG/DL — SIGNIFICANT CHANGE UP (ref 10–199)
CK MB BLD-MCNC: 3.1 % — SIGNIFICANT CHANGE UP (ref 0–3.5)
CK MB CFR SERPL CALC: 2.6 NG/ML — SIGNIFICANT CHANGE UP (ref 0–3.8)
CK SERPL-CCNC: 85 U/L — SIGNIFICANT CHANGE UP (ref 25–170)
CO2 BLDV-SCNC: 10 MMOL/L — LOW (ref 22–30)
CO2 BLDV-SCNC: 12 MMOL/L — LOW (ref 22–30)
CO2 BLDV-SCNC: 14 MMOL/L — LOW (ref 22–30)
CO2 BLDV-SCNC: 15 MMOL/L — LOW (ref 22–30)
CO2 SERPL-SCNC: 11 MMOL/L — LOW (ref 22–31)
CO2 SERPL-SCNC: 12 MMOL/L — LOW (ref 22–31)
CO2 SERPL-SCNC: 12 MMOL/L — LOW (ref 22–31)
CO2 SERPL-SCNC: 13 MMOL/L — LOW (ref 22–31)
CO2 SERPL-SCNC: 7 MMOL/L — CRITICAL LOW (ref 22–31)
CREAT SERPL-MCNC: 0.59 MG/DL — SIGNIFICANT CHANGE UP (ref 0.5–1.3)
CREAT SERPL-MCNC: 0.63 MG/DL — SIGNIFICANT CHANGE UP (ref 0.5–1.3)
CREAT SERPL-MCNC: 0.64 MG/DL — SIGNIFICANT CHANGE UP (ref 0.5–1.3)
CREAT SERPL-MCNC: 0.7 MG/DL — SIGNIFICANT CHANGE UP (ref 0.5–1.3)
CREAT SERPL-MCNC: 0.72 MG/DL — SIGNIFICANT CHANGE UP (ref 0.5–1.3)
CREAT SERPL-MCNC: 0.79 MG/DL — SIGNIFICANT CHANGE UP (ref 0.5–1.3)
CREAT SERPL-MCNC: 0.84 MG/DL — SIGNIFICANT CHANGE UP (ref 0.5–1.3)
CULTURE RESULTS: SIGNIFICANT CHANGE UP
E COLI DNA BLD POS QL NAA+NON-PROBE: SIGNIFICANT CHANGE UP
GAS PNL BLDV: 136 MMOL/L — SIGNIFICANT CHANGE UP (ref 136–145)
GAS PNL BLDV: 139 MMOL/L — SIGNIFICANT CHANGE UP (ref 136–145)
GAS PNL BLDV: SIGNIFICANT CHANGE UP
GLUCOSE BLDC GLUCOMTR-MCNC: 114 MG/DL — HIGH (ref 70–99)
GLUCOSE BLDC GLUCOMTR-MCNC: 125 MG/DL — HIGH (ref 70–99)
GLUCOSE BLDC GLUCOMTR-MCNC: 143 MG/DL — HIGH (ref 70–99)
GLUCOSE BLDC GLUCOMTR-MCNC: 145 MG/DL — HIGH (ref 70–99)
GLUCOSE BLDC GLUCOMTR-MCNC: 149 MG/DL — HIGH (ref 70–99)
GLUCOSE BLDC GLUCOMTR-MCNC: 158 MG/DL — HIGH (ref 70–99)
GLUCOSE BLDC GLUCOMTR-MCNC: 162 MG/DL — HIGH (ref 70–99)
GLUCOSE BLDC GLUCOMTR-MCNC: 173 MG/DL — HIGH (ref 70–99)
GLUCOSE BLDC GLUCOMTR-MCNC: 184 MG/DL — HIGH (ref 70–99)
GLUCOSE BLDC GLUCOMTR-MCNC: 191 MG/DL — HIGH (ref 70–99)
GLUCOSE BLDC GLUCOMTR-MCNC: 198 MG/DL — HIGH (ref 70–99)
GLUCOSE BLDC GLUCOMTR-MCNC: 199 MG/DL — HIGH (ref 70–99)
GLUCOSE BLDC GLUCOMTR-MCNC: 200 MG/DL — HIGH (ref 70–99)
GLUCOSE BLDC GLUCOMTR-MCNC: 206 MG/DL — HIGH (ref 70–99)
GLUCOSE BLDC GLUCOMTR-MCNC: 208 MG/DL — HIGH (ref 70–99)
GLUCOSE BLDC GLUCOMTR-MCNC: 211 MG/DL — HIGH (ref 70–99)
GLUCOSE BLDC GLUCOMTR-MCNC: 212 MG/DL — HIGH (ref 70–99)
GLUCOSE BLDC GLUCOMTR-MCNC: 212 MG/DL — HIGH (ref 70–99)
GLUCOSE BLDC GLUCOMTR-MCNC: 215 MG/DL — HIGH (ref 70–99)
GLUCOSE BLDC GLUCOMTR-MCNC: 217 MG/DL — HIGH (ref 70–99)
GLUCOSE BLDC GLUCOMTR-MCNC: 235 MG/DL — HIGH (ref 70–99)
GLUCOSE BLDC GLUCOMTR-MCNC: 72 MG/DL — SIGNIFICANT CHANGE UP (ref 70–99)
GLUCOSE BLDC GLUCOMTR-MCNC: 74 MG/DL — SIGNIFICANT CHANGE UP (ref 70–99)
GLUCOSE BLDV-MCNC: 189 MG/DL — HIGH (ref 70–99)
GLUCOSE BLDV-MCNC: 222 MG/DL — HIGH (ref 70–99)
GLUCOSE SERPL-MCNC: 125 MG/DL — HIGH (ref 70–99)
GLUCOSE SERPL-MCNC: 167 MG/DL — HIGH (ref 70–99)
GLUCOSE SERPL-MCNC: 182 MG/DL — HIGH (ref 70–99)
GLUCOSE SERPL-MCNC: 223 MG/DL — HIGH (ref 70–99)
GLUCOSE SERPL-MCNC: 227 MG/DL — HIGH (ref 70–99)
GLUCOSE SERPL-MCNC: 227 MG/DL — HIGH (ref 70–99)
GLUCOSE SERPL-MCNC: 279 MG/DL — HIGH (ref 70–99)
GRAM STN FLD: SIGNIFICANT CHANGE UP
HBA1C BLD-MCNC: 14.9 % — HIGH (ref 4–5.6)
HCO3 BLDV-SCNC: 11 MMOL/L — LOW (ref 21–29)
HCO3 BLDV-SCNC: 13 MMOL/L — LOW (ref 21–29)
HCO3 BLDV-SCNC: 14 MMOL/L — LOW (ref 21–29)
HCO3 BLDV-SCNC: 9 MMOL/L — LOW (ref 21–29)
HCT VFR BLD CALC: 36.4 % — SIGNIFICANT CHANGE UP (ref 34.5–45)
HCT VFR BLDA CALC: 33 % — LOW (ref 39–50)
HCT VFR BLDA CALC: 37 % — LOW (ref 39–50)
HDLC SERPL-MCNC: 16 MG/DL — LOW (ref 40–125)
HGB BLD CALC-MCNC: 10.6 G/DL — LOW (ref 11.5–15.5)
HGB BLD CALC-MCNC: 11.9 G/DL — SIGNIFICANT CHANGE UP (ref 11.5–15.5)
HGB BLD-MCNC: 11.5 G/DL — SIGNIFICANT CHANGE UP (ref 11.5–15.5)
HOROWITZ INDEX BLDV+IHG-RTO: 28 — SIGNIFICANT CHANGE UP
LACTATE BLDV-MCNC: 2.2 MMOL/L — HIGH (ref 0.7–2)
LACTATE BLDV-MCNC: 2.9 MMOL/L — HIGH (ref 0.7–2)
LIDOCAIN IGE QN: 30 U/L — SIGNIFICANT CHANGE UP (ref 7–60)
LIPID PNL WITH DIRECT LDL SERPL: 119 MG/DL — SIGNIFICANT CHANGE UP
MAGNESIUM SERPL-MCNC: 1.4 MG/DL — LOW (ref 1.6–2.6)
MAGNESIUM SERPL-MCNC: 1.4 MG/DL — LOW (ref 1.6–2.6)
MAGNESIUM SERPL-MCNC: 1.9 MG/DL — SIGNIFICANT CHANGE UP (ref 1.6–2.6)
MAGNESIUM SERPL-MCNC: 2 MG/DL — SIGNIFICANT CHANGE UP (ref 1.6–2.6)
MAGNESIUM SERPL-MCNC: 3.6 MG/DL — HIGH (ref 1.6–2.6)
MCHC RBC-ENTMCNC: 28.3 PG — SIGNIFICANT CHANGE UP (ref 27–34)
MCHC RBC-ENTMCNC: 31.6 GM/DL — LOW (ref 32–36)
MCV RBC AUTO: 89.8 FL — SIGNIFICANT CHANGE UP (ref 80–100)
METHOD TYPE: SIGNIFICANT CHANGE UP
OTHER CELLS CSF MANUAL: 7 ML/DL — LOW (ref 18–22)
OTHER CELLS CSF MANUAL: 9 ML/DL — LOW (ref 18–22)
PCO2 BLDV: 30 MMHG — LOW (ref 35–50)
PCO2 BLDV: 32 MMHG — LOW (ref 35–50)
PCO2 BLDV: 33 MMHG — LOW (ref 35–50)
PCO2 BLDV: 33 MMHG — LOW (ref 35–50)
PH BLDV: 7.06 — CRITICAL LOW (ref 7.35–7.45)
PH BLDV: 7.14 — CRITICAL LOW (ref 7.35–7.45)
PH BLDV: 7.21 — LOW (ref 7.35–7.45)
PH BLDV: 7.25 — LOW (ref 7.35–7.45)
PH BLDV: 7.26 — LOW (ref 7.35–7.45)
PH BLDV: 7.28 — LOW (ref 7.35–7.45)
PHOSPHATE SERPL-MCNC: 0.9 MG/DL — CRITICAL LOW (ref 2.5–4.5)
PHOSPHATE SERPL-MCNC: 1 MG/DL — CRITICAL LOW (ref 2.5–4.5)
PHOSPHATE SERPL-MCNC: 1.3 MG/DL — LOW (ref 2.5–4.5)
PHOSPHATE SERPL-MCNC: 1.3 MG/DL — LOW (ref 2.5–4.5)
PHOSPHATE SERPL-MCNC: 2.1 MG/DL — LOW (ref 2.5–4.5)
PHOSPHATE SERPL-MCNC: 2.8 MG/DL — SIGNIFICANT CHANGE UP (ref 2.5–4.5)
PHOSPHATE SERPL-MCNC: 3.9 MG/DL — SIGNIFICANT CHANGE UP (ref 2.5–4.5)
PLATELET # BLD AUTO: 274 K/UL — SIGNIFICANT CHANGE UP (ref 150–400)
PO2 BLDV: 23 MMHG — LOW (ref 25–45)
PO2 BLDV: 26 MMHG — SIGNIFICANT CHANGE UP (ref 25–45)
PO2 BLDV: 28 MMHG — SIGNIFICANT CHANGE UP (ref 25–45)
PO2 BLDV: 29 MMHG — SIGNIFICANT CHANGE UP (ref 25–45)
PO2 BLDV: 32 MMHG — SIGNIFICANT CHANGE UP (ref 25–45)
PO2 BLDV: 41 MMHG — SIGNIFICANT CHANGE UP (ref 25–45)
POTASSIUM BLDV-SCNC: 2.8 MMOL/L — CRITICAL LOW (ref 3.5–5)
POTASSIUM BLDV-SCNC: 3.1 MMOL/L — LOW (ref 3.5–5)
POTASSIUM SERPL-MCNC: 2.9 MMOL/L — CRITICAL LOW (ref 3.5–5.3)
POTASSIUM SERPL-MCNC: 3 MMOL/L — LOW (ref 3.5–5.3)
POTASSIUM SERPL-MCNC: 3.3 MMOL/L — LOW (ref 3.5–5.3)
POTASSIUM SERPL-MCNC: 3.5 MMOL/L — SIGNIFICANT CHANGE UP (ref 3.5–5.3)
POTASSIUM SERPL-MCNC: 3.7 MMOL/L — SIGNIFICANT CHANGE UP (ref 3.5–5.3)
POTASSIUM SERPL-MCNC: 3.7 MMOL/L — SIGNIFICANT CHANGE UP (ref 3.5–5.3)
POTASSIUM SERPL-MCNC: 6.1 MMOL/L — HIGH (ref 3.5–5.3)
POTASSIUM SERPL-SCNC: 2.9 MMOL/L — CRITICAL LOW (ref 3.5–5.3)
POTASSIUM SERPL-SCNC: 3 MMOL/L — LOW (ref 3.5–5.3)
POTASSIUM SERPL-SCNC: 3.3 MMOL/L — LOW (ref 3.5–5.3)
POTASSIUM SERPL-SCNC: 3.5 MMOL/L — SIGNIFICANT CHANGE UP (ref 3.5–5.3)
POTASSIUM SERPL-SCNC: 3.7 MMOL/L — SIGNIFICANT CHANGE UP (ref 3.5–5.3)
POTASSIUM SERPL-SCNC: 3.7 MMOL/L — SIGNIFICANT CHANGE UP (ref 3.5–5.3)
POTASSIUM SERPL-SCNC: 6.1 MMOL/L — HIGH (ref 3.5–5.3)
RBC # BLD: 4.05 M/UL — SIGNIFICANT CHANGE UP (ref 3.8–5.2)
RBC # FLD: 11.8 % — SIGNIFICANT CHANGE UP (ref 10.3–14.5)
SAO2 % BLDV: 43 % — LOW (ref 67–88)
SAO2 % BLDV: 54 % — LOW (ref 67–88)
SAO2 % BLDV: 58 % — LOW (ref 67–88)
SAO2 % BLDV: 60 % — LOW (ref 67–88)
SAO2 % BLDV: 64 % — LOW (ref 67–88)
SAO2 % BLDV: 80 % — SIGNIFICANT CHANGE UP (ref 67–88)
SODIUM SERPL-SCNC: 134 MMOL/L — LOW (ref 135–145)
SODIUM SERPL-SCNC: 136 MMOL/L — SIGNIFICANT CHANGE UP (ref 135–145)
SODIUM SERPL-SCNC: 137 MMOL/L — SIGNIFICANT CHANGE UP (ref 135–145)
SODIUM SERPL-SCNC: 138 MMOL/L — SIGNIFICANT CHANGE UP (ref 135–145)
SODIUM SERPL-SCNC: 139 MMOL/L — SIGNIFICANT CHANGE UP (ref 135–145)
SODIUM SERPL-SCNC: 140 MMOL/L — SIGNIFICANT CHANGE UP (ref 135–145)
SODIUM SERPL-SCNC: 142 MMOL/L — SIGNIFICANT CHANGE UP (ref 135–145)
SPECIMEN SOURCE: SIGNIFICANT CHANGE UP
TOTAL CHOLESTEROL/HDL RATIO MEASUREMENT: 11.1 RATIO — HIGH (ref 3.3–7.1)
TRIGL SERPL-MCNC: 216 MG/DL — HIGH (ref 10–149)
TROPONIN T SERPL-MCNC: <0.01 NG/ML — SIGNIFICANT CHANGE UP (ref 0–0.06)
WBC # BLD: 23.6 K/UL — HIGH (ref 3.8–10.5)
WBC # FLD AUTO: 23.6 K/UL — HIGH (ref 3.8–10.5)

## 2017-10-22 PROCEDURE — 99233 SBSQ HOSP IP/OBS HIGH 50: CPT | Mod: GC

## 2017-10-22 PROCEDURE — 93010 ELECTROCARDIOGRAM REPORT: CPT

## 2017-10-22 PROCEDURE — 74177 CT ABD & PELVIS W/CONTRAST: CPT | Mod: 26

## 2017-10-22 RX ORDER — PIPERACILLIN AND TAZOBACTAM 4; .5 G/20ML; G/20ML
3.38 INJECTION, POWDER, LYOPHILIZED, FOR SOLUTION INTRAVENOUS EVERY 8 HOURS
Qty: 0 | Refills: 0 | Status: DISCONTINUED | OUTPATIENT
Start: 2017-10-22 | End: 2017-10-22

## 2017-10-22 RX ORDER — MAGNESIUM SULFATE 500 MG/ML
2 VIAL (ML) INJECTION ONCE
Qty: 0 | Refills: 0 | Status: COMPLETED | OUTPATIENT
Start: 2017-10-22 | End: 2017-10-22

## 2017-10-22 RX ORDER — SODIUM CHLORIDE 9 MG/ML
1000 INJECTION, SOLUTION INTRAVENOUS
Qty: 0 | Refills: 0 | Status: DISCONTINUED | OUTPATIENT
Start: 2017-10-22 | End: 2017-10-22

## 2017-10-22 RX ORDER — POTASSIUM CHLORIDE 20 MEQ
20 PACKET (EA) ORAL
Qty: 0 | Refills: 0 | Status: DISCONTINUED | OUTPATIENT
Start: 2017-10-22 | End: 2017-10-22

## 2017-10-22 RX ORDER — INSULIN LISPRO 100/ML
VIAL (ML) SUBCUTANEOUS
Qty: 0 | Refills: 0 | Status: DISCONTINUED | OUTPATIENT
Start: 2017-10-22 | End: 2017-10-23

## 2017-10-22 RX ORDER — POTASSIUM PHOSPHATE, MONOBASIC POTASSIUM PHOSPHATE, DIBASIC 236; 224 MG/ML; MG/ML
15 INJECTION, SOLUTION INTRAVENOUS ONCE
Qty: 0 | Refills: 0 | Status: COMPLETED | OUTPATIENT
Start: 2017-10-22 | End: 2017-10-22

## 2017-10-22 RX ORDER — POTASSIUM CHLORIDE 20 MEQ
20 PACKET (EA) ORAL
Qty: 0 | Refills: 0 | Status: COMPLETED | OUTPATIENT
Start: 2017-10-22 | End: 2017-10-23

## 2017-10-22 RX ORDER — ONDANSETRON 8 MG/1
4 TABLET, FILM COATED ORAL ONCE
Qty: 0 | Refills: 0 | Status: COMPLETED | OUTPATIENT
Start: 2017-10-22 | End: 2017-10-22

## 2017-10-22 RX ORDER — INSULIN GLARGINE 100 [IU]/ML
30 INJECTION, SOLUTION SUBCUTANEOUS ONCE
Qty: 0 | Refills: 0 | Status: COMPLETED | OUTPATIENT
Start: 2017-10-22 | End: 2017-10-22

## 2017-10-22 RX ORDER — SODIUM CHLORIDE 9 MG/ML
1000 INJECTION INTRAMUSCULAR; INTRAVENOUS; SUBCUTANEOUS
Qty: 0 | Refills: 0 | Status: DISCONTINUED | OUTPATIENT
Start: 2017-10-22 | End: 2017-10-22

## 2017-10-22 RX ORDER — POTASSIUM CHLORIDE 20 MEQ
40 PACKET (EA) ORAL
Qty: 0 | Refills: 0 | Status: DISCONTINUED | OUTPATIENT
Start: 2017-10-22 | End: 2017-10-22

## 2017-10-22 RX ORDER — DEXTROSE MONOHYDRATE, SODIUM CHLORIDE, AND POTASSIUM CHLORIDE 50; .745; 4.5 G/1000ML; G/1000ML; G/1000ML
1000 INJECTION, SOLUTION INTRAVENOUS
Qty: 0 | Refills: 0 | Status: DISCONTINUED | OUTPATIENT
Start: 2017-10-22 | End: 2017-10-23

## 2017-10-22 RX ORDER — INFLUENZA VIRUS VACCINE 15; 15; 15; 15 UG/.5ML; UG/.5ML; UG/.5ML; UG/.5ML
0.5 SUSPENSION INTRAMUSCULAR ONCE
Qty: 0 | Refills: 0 | Status: COMPLETED | OUTPATIENT
Start: 2017-10-22 | End: 2017-10-27

## 2017-10-22 RX ORDER — SODIUM,POTASSIUM PHOSPHATES 278-250MG
1 POWDER IN PACKET (EA) ORAL
Qty: 0 | Refills: 0 | Status: DISCONTINUED | OUTPATIENT
Start: 2017-10-22 | End: 2017-10-22

## 2017-10-22 RX ORDER — POTASSIUM CHLORIDE 20 MEQ
40 PACKET (EA) ORAL
Qty: 0 | Refills: 0 | Status: COMPLETED | OUTPATIENT
Start: 2017-10-22 | End: 2017-10-23

## 2017-10-22 RX ORDER — INSULIN HUMAN 100 [IU]/ML
2 INJECTION, SOLUTION SUBCUTANEOUS
Qty: 100 | Refills: 0 | Status: DISCONTINUED | OUTPATIENT
Start: 2017-10-22 | End: 2017-10-22

## 2017-10-22 RX ORDER — PIPERACILLIN AND TAZOBACTAM 4; .5 G/20ML; G/20ML
4.5 INJECTION, POWDER, LYOPHILIZED, FOR SOLUTION INTRAVENOUS EVERY 8 HOURS
Qty: 0 | Refills: 0 | Status: DISCONTINUED | OUTPATIENT
Start: 2017-10-22 | End: 2017-10-22

## 2017-10-22 RX ORDER — MAGNESIUM SULFATE 500 MG/ML
1 VIAL (ML) INJECTION ONCE
Qty: 0 | Refills: 0 | Status: COMPLETED | OUTPATIENT
Start: 2017-10-22 | End: 2017-10-22

## 2017-10-22 RX ORDER — PIPERACILLIN AND TAZOBACTAM 4; .5 G/20ML; G/20ML
3.38 INJECTION, POWDER, LYOPHILIZED, FOR SOLUTION INTRAVENOUS ONCE
Qty: 0 | Refills: 0 | Status: COMPLETED | OUTPATIENT
Start: 2017-10-22 | End: 2017-10-22

## 2017-10-22 RX ORDER — POTASSIUM CHLORIDE 20 MEQ
40 PACKET (EA) ORAL
Qty: 0 | Refills: 0 | Status: COMPLETED | OUTPATIENT
Start: 2017-10-22 | End: 2017-10-22

## 2017-10-22 RX ORDER — INSULIN LISPRO 100/ML
VIAL (ML) SUBCUTANEOUS AT BEDTIME
Qty: 0 | Refills: 0 | Status: DISCONTINUED | OUTPATIENT
Start: 2017-10-22 | End: 2017-10-23

## 2017-10-22 RX ORDER — SODIUM,POTASSIUM PHOSPHATES 278-250MG
1 POWDER IN PACKET (EA) ORAL EVERY 6 HOURS
Qty: 0 | Refills: 0 | Status: COMPLETED | OUTPATIENT
Start: 2017-10-22 | End: 2017-10-22

## 2017-10-22 RX ORDER — INSULIN HUMAN 100 [IU]/ML
1 INJECTION, SOLUTION SUBCUTANEOUS
Qty: 100 | Refills: 0 | Status: DISCONTINUED | OUTPATIENT
Start: 2017-10-22 | End: 2017-10-22

## 2017-10-22 RX ORDER — ONDANSETRON 8 MG/1
4 TABLET, FILM COATED ORAL EVERY 6 HOURS
Qty: 0 | Refills: 0 | Status: DISCONTINUED | OUTPATIENT
Start: 2017-10-22 | End: 2017-10-27

## 2017-10-22 RX ORDER — INSULIN LISPRO 100/ML
10 VIAL (ML) SUBCUTANEOUS
Qty: 0 | Refills: 0 | Status: DISCONTINUED | OUTPATIENT
Start: 2017-10-22 | End: 2017-10-23

## 2017-10-22 RX ORDER — PIPERACILLIN AND TAZOBACTAM 4; .5 G/20ML; G/20ML
3.38 INJECTION, POWDER, LYOPHILIZED, FOR SOLUTION INTRAVENOUS EVERY 8 HOURS
Qty: 0 | Refills: 0 | Status: DISCONTINUED | OUTPATIENT
Start: 2017-10-22 | End: 2017-10-24

## 2017-10-22 RX ORDER — ACETAMINOPHEN 500 MG
650 TABLET ORAL EVERY 6 HOURS
Qty: 0 | Refills: 0 | Status: DISCONTINUED | OUTPATIENT
Start: 2017-10-22 | End: 2017-10-27

## 2017-10-22 RX ORDER — POTASSIUM CHLORIDE 20 MEQ
20 PACKET (EA) ORAL
Qty: 0 | Refills: 0 | Status: COMPLETED | OUTPATIENT
Start: 2017-10-22 | End: 2017-10-22

## 2017-10-22 RX ADMIN — SIMETHICONE 80 MILLIGRAM(S): 80 TABLET, CHEWABLE ORAL at 13:49

## 2017-10-22 RX ADMIN — POTASSIUM PHOSPHATE, MONOBASIC POTASSIUM PHOSPHATE, DIBASIC 62.5 MILLIMOLE(S): 236; 224 INJECTION, SOLUTION INTRAVENOUS at 21:43

## 2017-10-22 RX ADMIN — Medication 100 GRAM(S): at 14:43

## 2017-10-22 RX ADMIN — PIPERACILLIN AND TAZOBACTAM 25 GRAM(S): 4; .5 INJECTION, POWDER, LYOPHILIZED, FOR SOLUTION INTRAVENOUS at 22:16

## 2017-10-22 RX ADMIN — SIMETHICONE 80 MILLIGRAM(S): 80 TABLET, CHEWABLE ORAL at 22:18

## 2017-10-22 RX ADMIN — SODIUM CHLORIDE 50 MILLILITER(S): 9 INJECTION INTRAMUSCULAR; INTRAVENOUS; SUBCUTANEOUS at 01:19

## 2017-10-22 RX ADMIN — ONDANSETRON 4 MILLIGRAM(S): 8 TABLET, FILM COATED ORAL at 01:15

## 2017-10-22 RX ADMIN — INSULIN HUMAN 6 UNIT(S)/HR: 100 INJECTION, SOLUTION SUBCUTANEOUS at 05:53

## 2017-10-22 RX ADMIN — DEXTROSE MONOHYDRATE, SODIUM CHLORIDE, AND POTASSIUM CHLORIDE 100 MILLILITER(S): 50; .745; 4.5 INJECTION, SOLUTION INTRAVENOUS at 10:47

## 2017-10-22 RX ADMIN — Medication 2: at 18:56

## 2017-10-22 RX ADMIN — HEPARIN SODIUM 5000 UNIT(S): 5000 INJECTION INTRAVENOUS; SUBCUTANEOUS at 05:53

## 2017-10-22 RX ADMIN — Medication 40 MILLIEQUIVALENT(S): at 03:49

## 2017-10-22 RX ADMIN — INSULIN HUMAN 2 UNIT(S)/HR: 100 INJECTION, SOLUTION SUBCUTANEOUS at 13:18

## 2017-10-22 RX ADMIN — Medication 1 TABLET(S): at 05:52

## 2017-10-22 RX ADMIN — PIPERACILLIN AND TAZOBACTAM 200 GRAM(S): 4; .5 INJECTION, POWDER, LYOPHILIZED, FOR SOLUTION INTRAVENOUS at 16:47

## 2017-10-22 RX ADMIN — Medication 255 MILLIMOLE(S): at 20:28

## 2017-10-22 RX ADMIN — SIMETHICONE 80 MILLIGRAM(S): 80 TABLET, CHEWABLE ORAL at 09:27

## 2017-10-22 RX ADMIN — DEXTROSE MONOHYDRATE, SODIUM CHLORIDE, AND POTASSIUM CHLORIDE 100 MILLILITER(S): 50; .745; 4.5 INJECTION, SOLUTION INTRAVENOUS at 23:21

## 2017-10-22 RX ADMIN — Medication 255 MILLIMOLE(S): at 19:21

## 2017-10-22 RX ADMIN — INSULIN HUMAN 4 UNIT(S)/HR: 100 INJECTION, SOLUTION SUBCUTANEOUS at 10:09

## 2017-10-22 RX ADMIN — Medication 50 GRAM(S): at 09:26

## 2017-10-22 RX ADMIN — SIMETHICONE 80 MILLIGRAM(S): 80 TABLET, CHEWABLE ORAL at 18:07

## 2017-10-22 RX ADMIN — POLYETHYLENE GLYCOL 3350 17 GRAM(S): 17 POWDER, FOR SOLUTION ORAL at 12:08

## 2017-10-22 RX ADMIN — ONDANSETRON 4 MILLIGRAM(S): 8 TABLET, FILM COATED ORAL at 20:05

## 2017-10-22 RX ADMIN — INSULIN HUMAN 3 UNIT(S)/HR: 100 INJECTION, SOLUTION SUBCUTANEOUS at 09:23

## 2017-10-22 RX ADMIN — INSULIN HUMAN 3 UNIT(S)/HR: 100 INJECTION, SOLUTION SUBCUTANEOUS at 12:14

## 2017-10-22 RX ADMIN — SIMETHICONE 80 MILLIGRAM(S): 80 TABLET, CHEWABLE ORAL at 05:54

## 2017-10-22 RX ADMIN — Medication 1 TABLET(S): at 12:08

## 2017-10-22 RX ADMIN — SIMETHICONE 80 MILLIGRAM(S): 80 TABLET, CHEWABLE ORAL at 01:17

## 2017-10-22 RX ADMIN — INSULIN HUMAN 2 UNIT(S)/HR: 100 INJECTION, SOLUTION SUBCUTANEOUS at 02:12

## 2017-10-22 RX ADMIN — SODIUM CHLORIDE 150 MILLILITER(S): 9 INJECTION, SOLUTION INTRAVENOUS at 01:22

## 2017-10-22 RX ADMIN — SENNA PLUS 2 TABLET(S): 8.6 TABLET ORAL at 22:16

## 2017-10-22 RX ADMIN — Medication 10 UNIT(S): at 18:56

## 2017-10-22 RX ADMIN — HEPARIN SODIUM 5000 UNIT(S): 5000 INJECTION INTRAVENOUS; SUBCUTANEOUS at 13:49

## 2017-10-22 RX ADMIN — SODIUM CHLORIDE 100 MILLILITER(S): 9 INJECTION INTRAMUSCULAR; INTRAVENOUS; SUBCUTANEOUS at 05:53

## 2017-10-22 RX ADMIN — INSULIN HUMAN 5 UNIT(S)/HR: 100 INJECTION, SOLUTION SUBCUTANEOUS at 01:26

## 2017-10-22 RX ADMIN — HEPARIN SODIUM 5000 UNIT(S): 5000 INJECTION INTRAVENOUS; SUBCUTANEOUS at 22:16

## 2017-10-22 RX ADMIN — Medication 50 MILLIEQUIVALENT(S): at 23:10

## 2017-10-22 RX ADMIN — Medication 650 MILLIGRAM(S): at 20:21

## 2017-10-22 RX ADMIN — POTASSIUM PHOSPHATE, MONOBASIC POTASSIUM PHOSPHATE, DIBASIC 62.5 MILLIMOLE(S): 236; 224 INJECTION, SOLUTION INTRAVENOUS at 14:43

## 2017-10-22 RX ADMIN — SODIUM CHLORIDE 100 MILLILITER(S): 9 INJECTION, SOLUTION INTRAVENOUS at 08:13

## 2017-10-22 RX ADMIN — POTASSIUM PHOSPHATE, MONOBASIC POTASSIUM PHOSPHATE, DIBASIC 62.5 MILLIMOLE(S): 236; 224 INJECTION, SOLUTION INTRAVENOUS at 10:16

## 2017-10-22 RX ADMIN — INSULIN HUMAN 2 UNIT(S)/HR: 100 INJECTION, SOLUTION SUBCUTANEOUS at 16:15

## 2017-10-22 RX ADMIN — Medication 1 TABLET(S): at 02:10

## 2017-10-22 RX ADMIN — Medication 255 MILLIMOLE(S): at 03:49

## 2017-10-22 RX ADMIN — Medication 40 MILLIEQUIVALENT(S): at 02:10

## 2017-10-22 RX ADMIN — DEXTROSE MONOHYDRATE, SODIUM CHLORIDE, AND POTASSIUM CHLORIDE 50 MILLILITER(S): 50; .745; 4.5 INJECTION, SOLUTION INTRAVENOUS at 17:17

## 2017-10-22 RX ADMIN — Medication 50 MILLIEQUIVALENT(S): at 07:08

## 2017-10-22 RX ADMIN — SODIUM CHLORIDE 150 MILLILITER(S): 9 INJECTION, SOLUTION INTRAVENOUS at 05:54

## 2017-10-22 RX ADMIN — Medication 40 MILLIEQUIVALENT(S): at 23:11

## 2017-10-22 RX ADMIN — Medication 255 MILLIMOLE(S): at 02:09

## 2017-10-22 RX ADMIN — Medication 50 MILLIEQUIVALENT(S): at 05:53

## 2017-10-22 RX ADMIN — INSULIN GLARGINE 30 UNIT(S): 100 INJECTION, SOLUTION SUBCUTANEOUS at 18:55

## 2017-10-22 RX ADMIN — INSULIN HUMAN 4 UNIT(S)/HR: 100 INJECTION, SOLUTION SUBCUTANEOUS at 06:13

## 2017-10-22 RX ADMIN — INSULIN HUMAN 1 UNIT(S)/HR: 100 INJECTION, SOLUTION SUBCUTANEOUS at 14:18

## 2017-10-22 NOTE — PROGRESS NOTE ADULT - PROBLEM SELECTOR PLAN 4
abdominal pain likely in setting of DKA, other possible causes constipation, r/o obstruction , r/o pregnancy  send urine pregnancy profile  frequent abdominal exams   correct DKA   abdominal xray r/o obstruction   if constipated will start on stool softeners

## 2017-10-22 NOTE — PROGRESS NOTE ADULT - SUBJECTIVE AND OBJECTIVE BOX
CHIEF COMPLAINT: Patient is a 42y old  Female who presents with a chief complaint of SOB , chest pressure and fatigue (21 Oct 2017 19:51)    Interval Events: Pt had no acute evets overnight. Pt now on 2 of insulin gtt.       OBJECTIVE:  ICU Vital Signs Last 24 Hrs  T(C): 36.3 (22 Oct 2017 08:00), Max: 37.1 (22 Oct 2017 04:00)  T(F): 97.4 (22 Oct 2017 08:00), Max: 98.8 (22 Oct 2017 04:00)  HR: 110 (22 Oct 2017 08:00) (110 - 131)  BP: 107/68 (22 Oct 2017 08:00) (105/62 - 172/96)  BP(mean): 82 (22 Oct 2017 08:00) (77 - 112)  ABP: --  ABP(mean): --  RR: 27 (22 Oct 2017 08:00) (18 - 34)  SpO2: 100% (22 Oct 2017 08:00) (94% - 100%)        10-21 @ :01  -  10-22 @ 07:00  --------------------------------------------------------  IN: 8286 mL / OUT: 1850 mL / NET: 6436 mL    10-22 @ :01  -  10-22 @ 08:24  --------------------------------------------------------  IN: 152 mL / OUT: 75 mL / NET: 77 mL      CAPILLARY BLOOD GLUCOSE  206 (22 Oct 2017 08:00)      POCT Blood Glucose.: 206 mg/dL (22 Oct 2017 08:08)    PHYSICAL EXAM:  GENERAL: NAD, well-developed  HEAD:  Atraumatic, Normocephalic  EYES: EOMI, PERRLA, conjunctiva and sclera clear  NECK: Supple, No JVD  CHEST/LUNG: Clear to auscultation bilaterally; No wheeze  HEART: Regular rate and rhythm; No murmurs, rubs, or gallops  ABDOMEN: Soft, Nontender, Nondistended; Bowel sounds present  EXTREMITIES:  2+ Peripheral Pulses, No clubbing, cyanosis, or edema  PSYCH: AAOx3  NEUROLOGY: non-focal  SKIN: No rashes or lesions    HOSPITAL MEDICATIONS:  MEDICATIONS  (STANDING):  dextrose 5% + lactated ringers. 1000 milliLiter(s) (100 mL/Hr) IV Continuous <Continuous>  heparin  Injectable 5000 Unit(s) SubCutaneous every 8 hours  influenza   Vaccine 0.5 milliLiter(s) IntraMuscular once  insulin Infusion 2 Unit(s)/Hr (2 mL/Hr) IV Continuous <Continuous>  magnesium sulfate  IVPB 2 Gram(s) IV Intermittent once  polyethylene glycol 3350 17 Gram(s) Oral daily  potassium acid phosphate/sodium acid phosphate tablet (K-PHOS No. 2) 1 Tablet(s) Oral every 6 hours  senna 2 Tablet(s) Oral at bedtime  simethicone drops 80 milliGRAM(s) Oral every 4 hours    MEDICATIONS  (PRN):      LABS:  (10-22 @ 01:19)                        11.5  23.6 )-----------( 274                 36.4    Neutrophils = -- (--%)  Lymphocytes = -- (--%)  Eosinophils = -- (--%)  Basophils = -- (--%)  Monocytes = -- (--%)  Bands = --%    WBC Trend: 23.6<--, 26.8<--, 23.4<--  Hb Trend: 11.5<--, 11.9<--, 13.0<--  Plt Trend: 274<--, 347<--, 361<--  10-22    138  |  110<H>  |  10  ----------------------------<  227<H>  2.9<LL>   |  11<L>  |  0.79    Ca    7.4<L>      22 Oct 2017 04:51  Phos  2.1     10-22  Mg     1.4     10-22    TPro  9.4<H>  /  Alb  3.9  /  TBili  0.2  /  DBili  x   /  AST  14  /  ALT  16  /  AlkPhos  172<H>  10-21    Creatinine Trend: 0.79<--, 0.84<--, 1.03<--, 1.27<--  PT/INR - ( 21 Oct 2017 18:39 )   PT: 13.0 sec;   INR: 1.20 ratio         PTT - ( 21 Oct 2017 18:39 )  PTT:34.3 sec  Urinalysis Basic - ( 21 Oct 2017 19:13 )    Color: Colorless / Appearance: Clear / S.023 / pH: x  Gluc: x / Ketone: Large  / Bili: Negative / Urobili: Negative   Blood: x / Protein: 30 mg/dL / Nitrite: Negative   Leuk Esterase: Negative / RBC: 3-5 /HPF / WBC 3-5 /HPF   Sq Epi: x / Non Sq Epi: x / Bacteria: x        Venous Blood Gas:  10-22 @ 04:53  7.14/33/32/11/64  VBG Lactate: 2.2  Venous Blood Gas:  10-22 @ 01:16  7.06/32/23/9/43  VBG Lactate: 2.9  Venous Blood Gas:  10-21 @ 20:51  6.91/32/19/6/24  VBG Lactate: 2.2  Venous Blood Gas:  10-21 @ 18:39  6.93/23/48/4/73  VBG Lactate: 2.9    CARDIAC MARKERS ( 22 Oct 2017 01:19 )  Trop <0.01 ng/mL / CK 85 U/L / CKMB x       CARDIAC MARKERS ( 21 Oct 2017 18:39 )  Trop <0.01 ng/mL / CK 54 U/L / CKMB x

## 2017-10-22 NOTE — PROGRESS NOTE ADULT - PROBLEM SELECTOR PLAN 2
SOB & chest pressure radiating to back ; r/o ACS,  electrolytes imbalance   trend CE  EKG   telemetry monitoring  correct hyperkalemia / electrolyte imbalance  supplemental O2 PRN to maintain o2sat >94%  obtain VA duplex  TTE

## 2017-10-22 NOTE — PROGRESS NOTE ADULT - PROBLEM SELECTOR PLAN 1
DKA in setting of her non compliance with her insulin regimen . will also r/o other causes such as infectious process , toxicology source   Admit to MICU   c/w insulin gtt   c/w FS q 1 hr   c/w  BMP, MG, Phos, VBG w/ lytes , lactate, acetones q4 hrs  CXR  check procalcitonin, PAN cx, UA, legionella, urine tox , ETOH level , Hgba1c, lipid   IVF hydration   vital signs monitoring   endocrine consult  NPO   strict I&o's

## 2017-10-22 NOTE — PROGRESS NOTE ADULT - ASSESSMENT
43 y/o F with DMT2 p/ to ED with SOB , chest pressure found to be in DKA in setting of noncompliance with insulin regimen. Will also r/o other causes such as infectious process, ACLS.     #neuro  - AAox3 no focal deficits.   #pulm  - Pt was tachypneic from acidosis on arrival, but much improved now.     #cards  - HLD in the setting of DM2.   - Pt will benefit from statin therapy in d/c  - tachycardia likely from volume depletion in the setting of DKA. c/w IVf.     #Renal  - Prerenal azotemia in the setting of dehdration 2/2 DKA  - c/w IVf  - Avoid nephrotoxins.     #endo  - DKA: C/w insulin gtt at 2  - once gap closes start lantus and diet.   - endo c/s on monday.     #ID  - leukocytosis. no clear source of infection.   - Monitor off abx.     #heme  - stable     #GI  - NPO till AG closes.     #DVT ppx: HSQ

## 2017-10-22 NOTE — PROGRESS NOTE ADULT - ATTENDING COMMENTS
1. DKA due to non compliance. Pt on DKA protocol. Anion gap slowly closing. Pt also with hyperchloremic acidosis.  Change IVF to d5 1/2ns.  Pt can  drink water.  No evidence of infection. No MI.

## 2017-10-23 LAB
ALBUMIN SERPL ELPH-MCNC: 2.6 G/DL — LOW (ref 3.3–5)
ALP SERPL-CCNC: 206 U/L — HIGH (ref 40–120)
ALT FLD-CCNC: 48 U/L RC — HIGH (ref 10–45)
ANION GAP SERPL CALC-SCNC: 13 MMOL/L — SIGNIFICANT CHANGE UP (ref 5–17)
ANION GAP SERPL CALC-SCNC: 16 MMOL/L — SIGNIFICANT CHANGE UP (ref 5–17)
ANION GAP SERPL CALC-SCNC: 16 MMOL/L — SIGNIFICANT CHANGE UP (ref 5–17)
ANION GAP SERPL CALC-SCNC: 17 MMOL/L — SIGNIFICANT CHANGE UP (ref 5–17)
ANION GAP SERPL CALC-SCNC: 18 MMOL/L — HIGH (ref 5–17)
AST SERPL-CCNC: 92 U/L — HIGH (ref 10–40)
BASE EXCESS BLDV CALC-SCNC: -6.1 MMOL/L — LOW (ref -2–2)
BASE EXCESS BLDV CALC-SCNC: -7.6 MMOL/L — LOW (ref -2–2)
BASE EXCESS BLDV CALC-SCNC: -8.5 MMOL/L — LOW (ref -2–2)
BILIRUB DIRECT SERPL-MCNC: 0.1 MG/DL — SIGNIFICANT CHANGE UP (ref 0–0.2)
BILIRUB INDIRECT FLD-MCNC: 0 MG/DL — LOW (ref 0.2–1)
BILIRUB SERPL-MCNC: 0.1 MG/DL — LOW (ref 0.2–1.2)
BUN SERPL-MCNC: 3 MG/DL — LOW (ref 7–23)
BUN SERPL-MCNC: 4 MG/DL — LOW (ref 7–23)
BUN SERPL-MCNC: 5 MG/DL — LOW (ref 7–23)
CA-I SERPL-SCNC: 1.07 MMOL/L — LOW (ref 1.12–1.3)
CA-I SERPL-SCNC: 1.09 MMOL/L — LOW (ref 1.12–1.3)
CA-I SERPL-SCNC: 1.1 MMOL/L — LOW (ref 1.12–1.3)
CALCIUM SERPL-MCNC: 7 MG/DL — LOW (ref 8.4–10.5)
CALCIUM SERPL-MCNC: 7.2 MG/DL — LOW (ref 8.4–10.5)
CALCIUM SERPL-MCNC: 7.2 MG/DL — LOW (ref 8.4–10.5)
CALCIUM SERPL-MCNC: 7.6 MG/DL — LOW (ref 8.4–10.5)
CALCIUM SERPL-MCNC: 7.9 MG/DL — LOW (ref 8.4–10.5)
CHLORIDE BLDV-SCNC: 110 MMOL/L — HIGH (ref 96–108)
CHLORIDE BLDV-SCNC: 110 MMOL/L — HIGH (ref 96–108)
CHLORIDE BLDV-SCNC: 111 MMOL/L — HIGH (ref 96–108)
CHLORIDE SERPL-SCNC: 103 MMOL/L — SIGNIFICANT CHANGE UP (ref 96–108)
CHLORIDE SERPL-SCNC: 105 MMOL/L — SIGNIFICANT CHANGE UP (ref 96–108)
CHLORIDE SERPL-SCNC: 106 MMOL/L — SIGNIFICANT CHANGE UP (ref 96–108)
CHLORIDE SERPL-SCNC: 107 MMOL/L — SIGNIFICANT CHANGE UP (ref 96–108)
CHLORIDE SERPL-SCNC: 107 MMOL/L — SIGNIFICANT CHANGE UP (ref 96–108)
CO2 BLDV-SCNC: 17 MMOL/L — LOW (ref 22–30)
CO2 BLDV-SCNC: 17 MMOL/L — LOW (ref 22–30)
CO2 BLDV-SCNC: 18 MMOL/L — LOW (ref 22–30)
CO2 SERPL-SCNC: 14 MMOL/L — LOW (ref 22–31)
CO2 SERPL-SCNC: 14 MMOL/L — LOW (ref 22–31)
CO2 SERPL-SCNC: 15 MMOL/L — LOW (ref 22–31)
CO2 SERPL-SCNC: 16 MMOL/L — LOW (ref 22–31)
CO2 SERPL-SCNC: 17 MMOL/L — LOW (ref 22–31)
CREAT SERPL-MCNC: 0.55 MG/DL — SIGNIFICANT CHANGE UP (ref 0.5–1.3)
CREAT SERPL-MCNC: 0.59 MG/DL — SIGNIFICANT CHANGE UP (ref 0.5–1.3)
CREAT SERPL-MCNC: 0.61 MG/DL — SIGNIFICANT CHANGE UP (ref 0.5–1.3)
CREAT SERPL-MCNC: 0.67 MG/DL — SIGNIFICANT CHANGE UP (ref 0.5–1.3)
CREAT SERPL-MCNC: 0.69 MG/DL — SIGNIFICANT CHANGE UP (ref 0.5–1.3)
GAS PNL BLDV: 134 MMOL/L — LOW (ref 136–145)
GAS PNL BLDV: SIGNIFICANT CHANGE UP
GLUCOSE BLDC GLUCOMTR-MCNC: 137 MG/DL — HIGH (ref 70–99)
GLUCOSE BLDC GLUCOMTR-MCNC: 165 MG/DL — HIGH (ref 70–99)
GLUCOSE BLDC GLUCOMTR-MCNC: 166 MG/DL — HIGH (ref 70–99)
GLUCOSE BLDC GLUCOMTR-MCNC: 167 MG/DL — HIGH (ref 70–99)
GLUCOSE BLDC GLUCOMTR-MCNC: 180 MG/DL — HIGH (ref 70–99)
GLUCOSE BLDC GLUCOMTR-MCNC: 182 MG/DL — HIGH (ref 70–99)
GLUCOSE BLDC GLUCOMTR-MCNC: 182 MG/DL — HIGH (ref 70–99)
GLUCOSE BLDC GLUCOMTR-MCNC: 186 MG/DL — HIGH (ref 70–99)
GLUCOSE BLDC GLUCOMTR-MCNC: 189 MG/DL — HIGH (ref 70–99)
GLUCOSE BLDC GLUCOMTR-MCNC: 200 MG/DL — HIGH (ref 70–99)
GLUCOSE BLDC GLUCOMTR-MCNC: 201 MG/DL — HIGH (ref 70–99)
GLUCOSE BLDC GLUCOMTR-MCNC: 212 MG/DL — HIGH (ref 70–99)
GLUCOSE BLDC GLUCOMTR-MCNC: 216 MG/DL — HIGH (ref 70–99)
GLUCOSE BLDC GLUCOMTR-MCNC: 218 MG/DL — HIGH (ref 70–99)
GLUCOSE BLDC GLUCOMTR-MCNC: 241 MG/DL — HIGH (ref 70–99)
GLUCOSE BLDC GLUCOMTR-MCNC: 249 MG/DL — HIGH (ref 70–99)
GLUCOSE BLDC GLUCOMTR-MCNC: 268 MG/DL — HIGH (ref 70–99)
GLUCOSE BLDV-MCNC: 146 MG/DL — HIGH (ref 70–99)
GLUCOSE BLDV-MCNC: 157 MG/DL — HIGH (ref 70–99)
GLUCOSE BLDV-MCNC: 183 MG/DL — HIGH (ref 70–99)
GLUCOSE SERPL-MCNC: 157 MG/DL — HIGH (ref 70–99)
GLUCOSE SERPL-MCNC: 179 MG/DL — HIGH (ref 70–99)
GLUCOSE SERPL-MCNC: 192 MG/DL — HIGH (ref 70–99)
GLUCOSE SERPL-MCNC: 217 MG/DL — HIGH (ref 70–99)
GLUCOSE SERPL-MCNC: 266 MG/DL — HIGH (ref 70–99)
GRAM STN FLD: SIGNIFICANT CHANGE UP
HCO3 BLDV-SCNC: 16 MMOL/L — LOW (ref 21–29)
HCO3 BLDV-SCNC: 17 MMOL/L — LOW (ref 21–29)
HCO3 BLDV-SCNC: 17 MMOL/L — LOW (ref 21–29)
HCT VFR BLD CALC: 28.3 % — LOW (ref 34.5–45)
HCT VFR BLDA CALC: 28 % — LOW (ref 39–50)
HGB BLD CALC-MCNC: 8.9 G/DL — LOW (ref 11.5–15.5)
HGB BLD CALC-MCNC: 9 G/DL — LOW (ref 11.5–15.5)
HGB BLD CALC-MCNC: 9.1 G/DL — LOW (ref 11.5–15.5)
HGB BLD-MCNC: 9.4 G/DL — LOW (ref 11.5–15.5)
HOROWITZ INDEX BLDV+IHG-RTO: 21 — SIGNIFICANT CHANGE UP
LACTATE BLDV-MCNC: 1.9 MMOL/L — SIGNIFICANT CHANGE UP (ref 0.7–2)
LACTATE BLDV-MCNC: 2.3 MMOL/L — HIGH (ref 0.7–2)
LACTATE BLDV-MCNC: 2.7 MMOL/L — HIGH (ref 0.7–2)
MAGNESIUM SERPL-MCNC: 1.8 MG/DL — SIGNIFICANT CHANGE UP (ref 1.6–2.6)
MAGNESIUM SERPL-MCNC: 1.8 MG/DL — SIGNIFICANT CHANGE UP (ref 1.6–2.6)
MAGNESIUM SERPL-MCNC: 1.9 MG/DL — SIGNIFICANT CHANGE UP (ref 1.6–2.6)
MCHC RBC-ENTMCNC: 28.4 PG — SIGNIFICANT CHANGE UP (ref 27–34)
MCHC RBC-ENTMCNC: 33.1 GM/DL — SIGNIFICANT CHANGE UP (ref 32–36)
MCV RBC AUTO: 85.7 FL — SIGNIFICANT CHANGE UP (ref 80–100)
OTHER CELLS CSF MANUAL: 13 ML/DL — LOW (ref 18–22)
OTHER CELLS CSF MANUAL: 5 ML/DL — LOW (ref 18–22)
OTHER CELLS CSF MANUAL: 7 ML/DL — LOW (ref 18–22)
PCO2 BLDV: 25 MMHG — LOW (ref 35–50)
PCO2 BLDV: 31 MMHG — LOW (ref 35–50)
PCO2 BLDV: 32 MMHG — LOW (ref 35–50)
PH BLDV: 7.34 — LOW (ref 7.35–7.45)
PH BLDV: 7.34 — LOW (ref 7.35–7.45)
PH BLDV: 7.44 — SIGNIFICANT CHANGE UP (ref 7.35–7.45)
PHOSPHATE SERPL-MCNC: 1.2 MG/DL — LOW (ref 2.5–4.5)
PHOSPHATE SERPL-MCNC: 1.4 MG/DL — LOW (ref 2.5–4.5)
PHOSPHATE SERPL-MCNC: 1.9 MG/DL — LOW (ref 2.5–4.5)
PHOSPHATE SERPL-MCNC: 2.6 MG/DL — SIGNIFICANT CHANGE UP (ref 2.5–4.5)
PHOSPHATE SERPL-MCNC: 3.3 MG/DL — SIGNIFICANT CHANGE UP (ref 2.5–4.5)
PLATELET # BLD AUTO: 256 K/UL — SIGNIFICANT CHANGE UP (ref 150–400)
PO2 BLDV: 105 MMHG — HIGH (ref 25–45)
PO2 BLDV: 24 MMHG — LOW (ref 25–45)
PO2 BLDV: 27 MMHG — SIGNIFICANT CHANGE UP (ref 25–45)
POTASSIUM BLDV-SCNC: 3.1 MMOL/L — LOW (ref 3.5–5)
POTASSIUM BLDV-SCNC: 3.5 MMOL/L — SIGNIFICANT CHANGE UP (ref 3.5–5)
POTASSIUM BLDV-SCNC: 3.5 MMOL/L — SIGNIFICANT CHANGE UP (ref 3.5–5)
POTASSIUM SERPL-MCNC: 3.5 MMOL/L — SIGNIFICANT CHANGE UP (ref 3.5–5.3)
POTASSIUM SERPL-MCNC: 3.5 MMOL/L — SIGNIFICANT CHANGE UP (ref 3.5–5.3)
POTASSIUM SERPL-MCNC: 3.6 MMOL/L — SIGNIFICANT CHANGE UP (ref 3.5–5.3)
POTASSIUM SERPL-MCNC: 3.7 MMOL/L — SIGNIFICANT CHANGE UP (ref 3.5–5.3)
POTASSIUM SERPL-MCNC: 3.8 MMOL/L — SIGNIFICANT CHANGE UP (ref 3.5–5.3)
POTASSIUM SERPL-SCNC: 3.5 MMOL/L — SIGNIFICANT CHANGE UP (ref 3.5–5.3)
POTASSIUM SERPL-SCNC: 3.5 MMOL/L — SIGNIFICANT CHANGE UP (ref 3.5–5.3)
POTASSIUM SERPL-SCNC: 3.6 MMOL/L — SIGNIFICANT CHANGE UP (ref 3.5–5.3)
POTASSIUM SERPL-SCNC: 3.7 MMOL/L — SIGNIFICANT CHANGE UP (ref 3.5–5.3)
POTASSIUM SERPL-SCNC: 3.8 MMOL/L — SIGNIFICANT CHANGE UP (ref 3.5–5.3)
PROT SERPL-MCNC: 5.9 G/DL — LOW (ref 6–8.3)
RBC # BLD: 3.31 M/UL — LOW (ref 3.8–5.2)
RBC # FLD: 11.8 % — SIGNIFICANT CHANGE UP (ref 10.3–14.5)
SAO2 % BLDV: 43 % — LOW (ref 67–88)
SAO2 % BLDV: 53 % — LOW (ref 67–88)
SAO2 % BLDV: 99 % — HIGH (ref 67–88)
SODIUM SERPL-SCNC: 135 MMOL/L — SIGNIFICANT CHANGE UP (ref 135–145)
SODIUM SERPL-SCNC: 136 MMOL/L — SIGNIFICANT CHANGE UP (ref 135–145)
SODIUM SERPL-SCNC: 137 MMOL/L — SIGNIFICANT CHANGE UP (ref 135–145)
SODIUM SERPL-SCNC: 137 MMOL/L — SIGNIFICANT CHANGE UP (ref 135–145)
SODIUM SERPL-SCNC: 139 MMOL/L — SIGNIFICANT CHANGE UP (ref 135–145)
SPECIMEN SOURCE: SIGNIFICANT CHANGE UP
WBC # BLD: 17.5 K/UL — HIGH (ref 3.8–10.5)
WBC # FLD AUTO: 17.5 K/UL — HIGH (ref 3.8–10.5)

## 2017-10-23 PROCEDURE — 99233 SBSQ HOSP IP/OBS HIGH 50: CPT | Mod: GC

## 2017-10-23 RX ORDER — METOCLOPRAMIDE HCL 10 MG
10 TABLET ORAL ONCE
Qty: 0 | Refills: 0 | Status: COMPLETED | OUTPATIENT
Start: 2017-10-23 | End: 2017-10-23

## 2017-10-23 RX ORDER — INSULIN HUMAN 100 [IU]/ML
2 INJECTION, SOLUTION SUBCUTANEOUS
Qty: 100 | Refills: 0 | Status: DISCONTINUED | OUTPATIENT
Start: 2017-10-23 | End: 2017-10-24

## 2017-10-23 RX ORDER — POTASSIUM CHLORIDE 20 MEQ
20 PACKET (EA) ORAL
Qty: 0 | Refills: 0 | Status: COMPLETED | OUTPATIENT
Start: 2017-10-23 | End: 2017-10-23

## 2017-10-23 RX ORDER — CALCIUM GLUCONATE 100 MG/ML
2 VIAL (ML) INTRAVENOUS ONCE
Qty: 0 | Refills: 0 | Status: COMPLETED | OUTPATIENT
Start: 2017-10-23 | End: 2017-10-23

## 2017-10-23 RX ORDER — DOCUSATE SODIUM 100 MG
100 CAPSULE ORAL THREE TIMES A DAY
Qty: 0 | Refills: 0 | Status: DISCONTINUED | OUTPATIENT
Start: 2017-10-23 | End: 2017-10-27

## 2017-10-23 RX ADMIN — Medication 400 GRAM(S): at 19:28

## 2017-10-23 RX ADMIN — Medication 50 MILLIEQUIVALENT(S): at 09:09

## 2017-10-23 RX ADMIN — Medication 255 MILLIMOLE(S): at 22:42

## 2017-10-23 RX ADMIN — SIMETHICONE 80 MILLIGRAM(S): 80 TABLET, CHEWABLE ORAL at 09:09

## 2017-10-23 RX ADMIN — Medication 50 MILLIEQUIVALENT(S): at 03:09

## 2017-10-23 RX ADMIN — DEXTROSE MONOHYDRATE, SODIUM CHLORIDE, AND POTASSIUM CHLORIDE 50 MILLILITER(S): 50; .745; 4.5 INJECTION, SOLUTION INTRAVENOUS at 01:10

## 2017-10-23 RX ADMIN — INSULIN HUMAN 4 UNIT(S)/HR: 100 INJECTION, SOLUTION SUBCUTANEOUS at 02:07

## 2017-10-23 RX ADMIN — HEPARIN SODIUM 5000 UNIT(S): 5000 INJECTION INTRAVENOUS; SUBCUTANEOUS at 22:10

## 2017-10-23 RX ADMIN — DEXTROSE MONOHYDRATE, SODIUM CHLORIDE, AND POTASSIUM CHLORIDE 50 MILLILITER(S): 50; .745; 4.5 INJECTION, SOLUTION INTRAVENOUS at 01:44

## 2017-10-23 RX ADMIN — Medication 50 MILLIEQUIVALENT(S): at 01:11

## 2017-10-23 RX ADMIN — SIMETHICONE 80 MILLIGRAM(S): 80 TABLET, CHEWABLE ORAL at 02:48

## 2017-10-23 RX ADMIN — SIMETHICONE 80 MILLIGRAM(S): 80 TABLET, CHEWABLE ORAL at 05:03

## 2017-10-23 RX ADMIN — Medication 400 GRAM(S): at 05:44

## 2017-10-23 RX ADMIN — HEPARIN SODIUM 5000 UNIT(S): 5000 INJECTION INTRAVENOUS; SUBCUTANEOUS at 13:00

## 2017-10-23 RX ADMIN — Medication 255 MILLIMOLE(S): at 20:58

## 2017-10-23 RX ADMIN — Medication 255 MILLIMOLE(S): at 05:05

## 2017-10-23 RX ADMIN — ONDANSETRON 4 MILLIGRAM(S): 8 TABLET, FILM COATED ORAL at 10:15

## 2017-10-23 RX ADMIN — Medication 10 MILLIGRAM(S): at 09:09

## 2017-10-23 RX ADMIN — Medication 10 MILLIGRAM(S): at 01:17

## 2017-10-23 RX ADMIN — HEPARIN SODIUM 5000 UNIT(S): 5000 INJECTION INTRAVENOUS; SUBCUTANEOUS at 05:02

## 2017-10-23 RX ADMIN — Medication 50 MILLIEQUIVALENT(S): at 07:49

## 2017-10-23 RX ADMIN — Medication 255 MILLIMOLE(S): at 06:41

## 2017-10-23 RX ADMIN — Medication 255 MILLIMOLE(S): at 19:27

## 2017-10-23 RX ADMIN — PIPERACILLIN AND TAZOBACTAM 25 GRAM(S): 4; .5 INJECTION, POWDER, LYOPHILIZED, FOR SOLUTION INTRAVENOUS at 13:00

## 2017-10-23 RX ADMIN — Medication 650 MILLIGRAM(S): at 03:49

## 2017-10-23 RX ADMIN — INSULIN HUMAN 3 UNIT(S)/HR: 100 INJECTION, SOLUTION SUBCUTANEOUS at 02:04

## 2017-10-23 RX ADMIN — PIPERACILLIN AND TAZOBACTAM 25 GRAM(S): 4; .5 INJECTION, POWDER, LYOPHILIZED, FOR SOLUTION INTRAVENOUS at 22:42

## 2017-10-23 RX ADMIN — Medication 50 MILLIEQUIVALENT(S): at 05:03

## 2017-10-23 RX ADMIN — PIPERACILLIN AND TAZOBACTAM 25 GRAM(S): 4; .5 INJECTION, POWDER, LYOPHILIZED, FOR SOLUTION INTRAVENOUS at 05:02

## 2017-10-23 RX ADMIN — ONDANSETRON 4 MILLIGRAM(S): 8 TABLET, FILM COATED ORAL at 03:48

## 2017-10-23 NOTE — PROGRESS NOTE ADULT - ASSESSMENT
43 y/o F with DMT2 p/ to ED with SOB , chest pressure found to be in DKA in setting of noncompliance with insulin regimen. Will also r/o other causes such as infectious process, ACLS.     #Neuro  - AAox3 no focal deficits.  metabolic encephalopathy 2/2 DKA and sepsis  - expected to resolve with treatment of infection and DKA     #Pulm  - Pt was tachypneic from acidosis on arrival, but much improved now.   - pH improving and PCO2 stable.     #cards  - HLD in the setting of DM2.   - Pt will benefit from statin therapy in d/c  - tachycardia likely from volume depletion in the setting of DKA. c/w IVf.     #Renal  - Prerenal azotemia in the setting of dehdration 2/2 DKA - resolved.   - c/w IVf  - Avoid nephrotoxins.     #endo  - DKA:   pt started transition to basal bolus yesterday, however, AG opened again.   pt made NPO and insulin gtt restarted.     #ID  - Sepsis 2/2 e. coli bacteremia.   - unclear source f/u ct a/p  - pt may need TTE if persistent cx and fevers.  - c/w zosyn     #Heme  - hgb trending down maybe 2/2 fluids  - if continues to trend down will check iron studies and FOBT    #GI  - NPO till AG closes.     #DVT ppx: HSQ

## 2017-10-23 NOTE — DIETITIAN INITIAL EVALUATION ADULT. - ADHERENCE
pt stated that she doesn't have a particular eating pattern, stated that her work schedule affects her eating pattern, knows what she is supposed to eat, avoids juice, drinks crystal light. Sometimes has fast food for breakfast and lunch. Doesn't regularly check BG, stated she takes glucometer to work but sometimes doesn't have the chance to check BG. Stated last A1c was around 14 and that it was starting to come down. Pt stated that she has had DM for 12-13 years and has never had to be hospitalized for it, verbalized willingness to make changes to meal pattern and to check BG. declined written diet info.

## 2017-10-23 NOTE — DIETITIAN INITIAL EVALUATION ADULT. - ENERGY NEEDS
Ht: 62"   Wt: 155  BMI: 28.5 kg/m2   IBW: 110 (+/-10%)     141% IBW  Edema: 1+ left arm   Skin: no pressure injuries

## 2017-10-23 NOTE — DIETITIAN INITIAL EVALUATION ADULT. - OTHER INFO
Pt seen for: MICU length of stay.   Adm dx: DKA.     GI issues: c/o N/V/C  Last BM: 10/18 (on bowel regimen)     Food allergies:  NKFA  Vit/supplement PTA: none

## 2017-10-23 NOTE — PROGRESS NOTE ADULT - ATTENDING COMMENTS
1. DkA from non compliance also now with gram negative bacteremia. Continue on insulin drip. gap has closed but pt is not eating.  2.gram neg bacteremia. Await results of CT ABD. Bedside ultrasounds reveals normal gallbladder. No stones.Continue Zosyn. UA negative.

## 2017-10-23 NOTE — DIETITIAN INITIAL EVALUATION ADULT. - NS AS NUTRI INTERV ED CONTENT
pt declined written info at this time, advised pt that RD is available to review meal plan prior to D/C

## 2017-10-23 NOTE — PROGRESS NOTE ADULT - SUBJECTIVE AND OBJECTIVE BOX
CHIEF COMPLAINT: Patient is a 42y old  Female who presents with a chief complaint of SOB , chest pressure and fatigue (21 Oct 2017 19:51)    Interval Events:  Pts gap reopened overnight. She was started back on the insulin gtt. Kept NPO. CT a/p was done. Pt still having some N/V     OBJECTIVE:  ICU Vital Signs Last 24 Hrs  T(C): 37.5 (23 Oct 2017 05:00), Max: 38.6 (22 Oct 2017 20:00)  T(F): 99.5 (23 Oct 2017 05:00), Max: 101.5 (23 Oct 2017 04:00)  HR: 100 (23 Oct 2017 07:00) (100 - 115)  BP: 102/71 (23 Oct 2017 07:00) (102/71 - 140/81)  BP(mean): 83 (23 Oct 2017 07:00) (78 - 104)  ABP: --  ABP(mean): --  RR: 19 (23 Oct 2017 07:00) (10 - 31)  SpO2: 100% (23 Oct 2017 07:00) (99% - 100%)        10-22 @ 07:01  -  10-23 @ 07:00  --------------------------------------------------------  IN: 6275 mL / OUT: 2535 mL / NET: 3740 mL      CAPILLARY BLOOD GLUCOSE  186 (23 Oct 2017 07:00)      POCT Blood Glucose.: 186 mg/dL (23 Oct 2017 06:54)      PHYSICAL EXAM:  GENERAL: NAD, well-developed  HEAD:  Atraumatic, Normocephalic  EYES: EOMI, PERRLA, conjunctiva and sclera clear  NECK: Supple, No JVD  CHEST/LUNG: Clear to auscultation bilaterally; No wheeze  HEART: Regular rate and rhythm; No murmurs, rubs, or gallops  ABDOMEN: Soft, tender, Nondistended; Bowel sounds present  EXTREMITIES:  2+ Peripheral Pulses, No clubbing, cyanosis, or edema  PSYCH: AAOx3  NEUROLOGY: non-focal  SKIN: No rashes or lesions    HOSPITAL MEDICATIONS:  MEDICATIONS  (STANDING):  dextrose 5% + sodium chloride 0.45% with potassium chloride 20 mEq/L 1000 milliLiter(s) (100 mL/Hr) IV Continuous <Continuous>  heparin  Injectable 5000 Unit(s) SubCutaneous every 8 hours  influenza   Vaccine 0.5 milliLiter(s) IntraMuscular once  insulin Infusion 2 Unit(s)/Hr (2 mL/Hr) IV Continuous <Continuous>  piperacillin/tazobactam IVPB. 3.375 Gram(s) IV Intermittent every 8 hours  polyethylene glycol 3350 17 Gram(s) Oral daily  potassium chloride  20 mEq/100 mL IVPB 20 milliEquivalent(s) IV Intermittent every 2 hours  senna 2 Tablet(s) Oral at bedtime  simethicone drops 80 milliGRAM(s) Oral every 4 hours    MEDICATIONS  (PRN):  acetaminophen   Tablet 650 milliGRAM(s) Oral every 6 hours PRN For Temp greater than 38 C (100.4 F)  ondansetron Injectable 4 milliGRAM(s) IV Push every 6 hours PRN Nausea and/or Vomiting      LABS:  (10-23 @ 04:28)                        9.4  17.5 )-----------( 256                 28.3    Neutrophils = -- (--%)  Lymphocytes = -- (--%)  Eosinophils = -- (--%)  Basophils = -- (--%)  Monocytes = -- (--%)  Bands = --%    WBC Trend: 17.5<--, 23.6<--, 26.8<--  Hb Trend: 9.4<--, 11.5<--, 11.9<--, 13.0<--  Plt Trend: 256<--, 274<--, 347<--, 361<--  10-23    139  |  107  |  4<L>  ----------------------------<  179<H>  3.5   |  15<L>  |  0.69    Ca    7.0<L>      23 Oct 2017 04:28  Phos  1.4     10-23  Mg     1.9     10-23    TPro  9.4<H>  /  Alb  3.9  /  TBili  0.2  /  DBili  x   /  AST  14  /  ALT  16  /  AlkPhos  172<H>  10    Creatinine Trend: 0.69<--, 0.67<--, 0.59<--, 0.63<--, 0.72<--, 0.64<--  PT/INR - ( 21 Oct 2017 18:39 )   PT: 13.0 sec;   INR: 1.20 ratio         PTT - ( 21 Oct 2017 18:39 )  PTT:34.3 sec  Urinalysis Basic - ( 21 Oct 2017 19:13 )    Color: Colorless / Appearance: Clear / S.023 / pH: x  Gluc: x / Ketone: Large  / Bili: Negative / Urobili: Negative   Blood: x / Protein: 30 mg/dL / Nitrite: Negative   Leuk Esterase: Negative / RBC: 3-5 /HPF / WBC 3-5 /HPF   Sq Epi: x / Non Sq Epi: x / Bacteria: x        Venous Blood Gas:  10-23 @ 05:32  7.34/31///43  VBG Lactate: 2.7  Venous Blood Gas:  10-22 @ 17:48  7.28/32/29/14/60  VBG Lactate: --  Venous Blood Gas:  10-22 @ 16:27  7.26/30/41//80  VBG Lactate: --  Venous Blood Gas:  10-22 @ 13:14  7.25/32///54  VBG Lactate: --  Venous Blood Gas:  10-22 @ 09:26  7.21/33/28//58  VBG Lactate: --  Venous Blood Gas:  10-22 @ 04:53  7.14/33/32/11/64  VBG Lactate: 2.2  Venous Blood Gas:  10-22 @ 01:16  7.06/32//43  VBG Lactate: 2.9  Venous Blood Gas:  10-21 @ 20:51  6.91/32/19/6/24  VBG Lactate: 2.2  Venous Blood Gas:  10-21 @ 18:39  6.93/23/48/4/73  VBG Lactate: 2.9    CARDIAC MARKERS ( 22 Oct 2017 01:19 )  Trop <0.01 ng/mL / CK 85 U/L / CKMB x       CARDIAC MARKERS ( 21 Oct 2017 18:39 )  Trop <0.01 ng/mL / CK 54 U/L / CKMB x

## 2017-10-24 DIAGNOSIS — E78.5 HYPERLIPIDEMIA, UNSPECIFIED: ICD-10-CM

## 2017-10-24 DIAGNOSIS — E13.10 OTHER SPECIFIED DIABETES MELLITUS WITH KETOACIDOSIS WITHOUT COMA: ICD-10-CM

## 2017-10-24 DIAGNOSIS — I10 ESSENTIAL (PRIMARY) HYPERTENSION: ICD-10-CM

## 2017-10-24 LAB
-  AMIKACIN: SIGNIFICANT CHANGE UP
-  AMPICILLIN/SULBACTAM: SIGNIFICANT CHANGE UP
-  AMPICILLIN: SIGNIFICANT CHANGE UP
-  AZTREONAM: SIGNIFICANT CHANGE UP
-  CEFAZOLIN: SIGNIFICANT CHANGE UP
-  CEFEPIME: SIGNIFICANT CHANGE UP
-  CEFOXITIN: SIGNIFICANT CHANGE UP
-  CEFTAZIDIME: SIGNIFICANT CHANGE UP
-  CEFTRIAXONE: SIGNIFICANT CHANGE UP
-  CIPROFLOXACIN: SIGNIFICANT CHANGE UP
-  ERTAPENEM: SIGNIFICANT CHANGE UP
-  GENTAMICIN: SIGNIFICANT CHANGE UP
-  IMIPENEM: SIGNIFICANT CHANGE UP
-  LEVOFLOXACIN: SIGNIFICANT CHANGE UP
-  MEROPENEM: SIGNIFICANT CHANGE UP
-  PIPERACILLIN/TAZOBACTAM: SIGNIFICANT CHANGE UP
-  TOBRAMYCIN: SIGNIFICANT CHANGE UP
-  TRIMETHOPRIM/SULFAMETHOXAZOLE: SIGNIFICANT CHANGE UP
ALBUMIN SERPL ELPH-MCNC: 2.3 G/DL — LOW (ref 3.3–5)
ALP SERPL-CCNC: 173 U/L — HIGH (ref 40–120)
ALT FLD-CCNC: 38 U/L RC — SIGNIFICANT CHANGE UP (ref 10–45)
ANION GAP SERPL CALC-SCNC: 12 MMOL/L — SIGNIFICANT CHANGE UP (ref 5–17)
ANION GAP SERPL CALC-SCNC: 14 MMOL/L — SIGNIFICANT CHANGE UP (ref 5–17)
ANION GAP SERPL CALC-SCNC: 15 MMOL/L — SIGNIFICANT CHANGE UP (ref 5–17)
ANION GAP SERPL CALC-SCNC: 15 MMOL/L — SIGNIFICANT CHANGE UP (ref 5–17)
ANION GAP SERPL CALC-SCNC: 19 MMOL/L — HIGH (ref 5–17)
AST SERPL-CCNC: 39 U/L — SIGNIFICANT CHANGE UP (ref 10–40)
BASE EXCESS BLDV CALC-SCNC: -4.6 MMOL/L — LOW (ref -2–2)
BILIRUB SERPL-MCNC: 0.2 MG/DL — SIGNIFICANT CHANGE UP (ref 0.2–1.2)
BUN SERPL-MCNC: 2 MG/DL — LOW (ref 7–23)
BUN SERPL-MCNC: 3 MG/DL — LOW (ref 7–23)
BUN SERPL-MCNC: 3 MG/DL — LOW (ref 7–23)
CA-I SERPL-SCNC: 1.09 MMOL/L — LOW (ref 1.12–1.3)
CALCIUM SERPL-MCNC: 7.1 MG/DL — LOW (ref 8.4–10.5)
CALCIUM SERPL-MCNC: 7.7 MG/DL — LOW (ref 8.4–10.5)
CALCIUM SERPL-MCNC: 7.8 MG/DL — LOW (ref 8.4–10.5)
CALCIUM SERPL-MCNC: 7.8 MG/DL — LOW (ref 8.4–10.5)
CALCIUM SERPL-MCNC: 8.1 MG/DL — LOW (ref 8.4–10.5)
CHLORIDE BLDV-SCNC: 106 MMOL/L — SIGNIFICANT CHANGE UP (ref 96–108)
CHLORIDE SERPL-SCNC: 101 MMOL/L — SIGNIFICANT CHANGE UP (ref 96–108)
CHLORIDE SERPL-SCNC: 103 MMOL/L — SIGNIFICANT CHANGE UP (ref 96–108)
CHLORIDE SERPL-SCNC: 105 MMOL/L — SIGNIFICANT CHANGE UP (ref 96–108)
CHLORIDE SERPL-SCNC: 105 MMOL/L — SIGNIFICANT CHANGE UP (ref 96–108)
CHLORIDE SERPL-SCNC: 108 MMOL/L — SIGNIFICANT CHANGE UP (ref 96–108)
CO2 BLDV-SCNC: 19 MMOL/L — LOW (ref 22–30)
CO2 SERPL-SCNC: 16 MMOL/L — LOW (ref 22–31)
CO2 SERPL-SCNC: 16 MMOL/L — LOW (ref 22–31)
CO2 SERPL-SCNC: 18 MMOL/L — LOW (ref 22–31)
CO2 SERPL-SCNC: 20 MMOL/L — LOW (ref 22–31)
CO2 SERPL-SCNC: 23 MMOL/L — SIGNIFICANT CHANGE UP (ref 22–31)
CREAT SERPL-MCNC: 0.43 MG/DL — LOW (ref 0.5–1.3)
CREAT SERPL-MCNC: 0.48 MG/DL — LOW (ref 0.5–1.3)
CREAT SERPL-MCNC: 0.57 MG/DL — SIGNIFICANT CHANGE UP (ref 0.5–1.3)
CREAT SERPL-MCNC: 0.58 MG/DL — SIGNIFICANT CHANGE UP (ref 0.5–1.3)
CREAT SERPL-MCNC: 0.64 MG/DL — SIGNIFICANT CHANGE UP (ref 0.5–1.3)
CULTURE RESULTS: SIGNIFICANT CHANGE UP
GAS PNL BLDV: 135 MMOL/L — LOW (ref 136–145)
GAS PNL BLDV: SIGNIFICANT CHANGE UP
GAS PNL BLDV: SIGNIFICANT CHANGE UP
GLUCOSE BLDC GLUCOMTR-MCNC: 101 MG/DL — HIGH (ref 70–99)
GLUCOSE BLDC GLUCOMTR-MCNC: 128 MG/DL — HIGH (ref 70–99)
GLUCOSE BLDC GLUCOMTR-MCNC: 157 MG/DL — HIGH (ref 70–99)
GLUCOSE BLDC GLUCOMTR-MCNC: 160 MG/DL — HIGH (ref 70–99)
GLUCOSE BLDC GLUCOMTR-MCNC: 164 MG/DL — HIGH (ref 70–99)
GLUCOSE BLDC GLUCOMTR-MCNC: 166 MG/DL — HIGH (ref 70–99)
GLUCOSE BLDC GLUCOMTR-MCNC: 168 MG/DL — HIGH (ref 70–99)
GLUCOSE BLDC GLUCOMTR-MCNC: 173 MG/DL — HIGH (ref 70–99)
GLUCOSE BLDC GLUCOMTR-MCNC: 177 MG/DL — HIGH (ref 70–99)
GLUCOSE BLDC GLUCOMTR-MCNC: 183 MG/DL — HIGH (ref 70–99)
GLUCOSE BLDC GLUCOMTR-MCNC: 187 MG/DL — HIGH (ref 70–99)
GLUCOSE BLDC GLUCOMTR-MCNC: 191 MG/DL — HIGH (ref 70–99)
GLUCOSE BLDC GLUCOMTR-MCNC: 197 MG/DL — HIGH (ref 70–99)
GLUCOSE BLDC GLUCOMTR-MCNC: 202 MG/DL — HIGH (ref 70–99)
GLUCOSE BLDC GLUCOMTR-MCNC: 204 MG/DL — HIGH (ref 70–99)
GLUCOSE BLDC GLUCOMTR-MCNC: 57 MG/DL — LOW (ref 70–99)
GLUCOSE BLDC GLUCOMTR-MCNC: 60 MG/DL — LOW (ref 70–99)
GLUCOSE BLDC GLUCOMTR-MCNC: 62 MG/DL — LOW (ref 70–99)
GLUCOSE BLDC GLUCOMTR-MCNC: 62 MG/DL — LOW (ref 70–99)
GLUCOSE BLDC GLUCOMTR-MCNC: 90 MG/DL — SIGNIFICANT CHANGE UP (ref 70–99)
GLUCOSE BLDV-MCNC: 153 MG/DL — HIGH (ref 70–99)
GLUCOSE SERPL-MCNC: 151 MG/DL — HIGH (ref 70–99)
GLUCOSE SERPL-MCNC: 215 MG/DL — HIGH (ref 70–99)
GLUCOSE SERPL-MCNC: 35 MG/DL — CRITICAL LOW (ref 70–99)
GLUCOSE SERPL-MCNC: 59 MG/DL — LOW (ref 70–99)
GLUCOSE SERPL-MCNC: 91 MG/DL — SIGNIFICANT CHANGE UP (ref 70–99)
GRAM STN FLD: SIGNIFICANT CHANGE UP
GRAM STN FLD: SIGNIFICANT CHANGE UP
HCO3 BLDV-SCNC: 18 MMOL/L — LOW (ref 21–29)
HCT VFR BLD CALC: 25 % — LOW (ref 34.5–45)
HCT VFR BLDA CALC: 22 % — CRITICAL LOW (ref 39–50)
HGB BLD CALC-MCNC: 6.9 G/DL — CRITICAL LOW (ref 11.5–15.5)
HGB BLD-MCNC: 8.6 G/DL — LOW (ref 11.5–15.5)
HOROWITZ INDEX BLDV+IHG-RTO: SIGNIFICANT CHANGE UP
LACTATE BLDV-MCNC: 3.8 MMOL/L — HIGH (ref 0.7–2)
MAGNESIUM SERPL-MCNC: 1.6 MG/DL — SIGNIFICANT CHANGE UP (ref 1.6–2.6)
MAGNESIUM SERPL-MCNC: 1.7 MG/DL — SIGNIFICANT CHANGE UP (ref 1.6–2.6)
MAGNESIUM SERPL-MCNC: 1.7 MG/DL — SIGNIFICANT CHANGE UP (ref 1.6–2.6)
MAGNESIUM SERPL-MCNC: 2 MG/DL — SIGNIFICANT CHANGE UP (ref 1.6–2.6)
MAGNESIUM SERPL-MCNC: 2.1 MG/DL — SIGNIFICANT CHANGE UP (ref 1.6–2.6)
MCHC RBC-ENTMCNC: 28.9 PG — SIGNIFICANT CHANGE UP (ref 27–34)
MCHC RBC-ENTMCNC: 34.3 GM/DL — SIGNIFICANT CHANGE UP (ref 32–36)
MCV RBC AUTO: 84.2 FL — SIGNIFICANT CHANGE UP (ref 80–100)
METHOD TYPE: SIGNIFICANT CHANGE UP
ORGANISM # SPEC MICROSCOPIC CNT: SIGNIFICANT CHANGE UP
OTHER CELLS CSF MANUAL: 10 ML/DL — LOW (ref 18–22)
PCO2 BLDV: 26 MMHG — LOW (ref 35–50)
PH BLDV: 7.46 — HIGH (ref 7.35–7.45)
PHOSPHATE SERPL-MCNC: 1.9 MG/DL — LOW (ref 2.5–4.5)
PHOSPHATE SERPL-MCNC: 2.4 MG/DL — LOW (ref 2.5–4.5)
PHOSPHATE SERPL-MCNC: 2.8 MG/DL — SIGNIFICANT CHANGE UP (ref 2.5–4.5)
PHOSPHATE SERPL-MCNC: 2.8 MG/DL — SIGNIFICANT CHANGE UP (ref 2.5–4.5)
PHOSPHATE SERPL-MCNC: 3.7 MG/DL — SIGNIFICANT CHANGE UP (ref 2.5–4.5)
PLATELET # BLD AUTO: 243 K/UL — SIGNIFICANT CHANGE UP (ref 150–400)
PO2 BLDV: 131 MMHG — HIGH (ref 25–45)
POTASSIUM BLDV-SCNC: 3 MMOL/L — LOW (ref 3.5–5)
POTASSIUM SERPL-MCNC: 2.9 MMOL/L — CRITICAL LOW (ref 3.5–5.3)
POTASSIUM SERPL-MCNC: 3.3 MMOL/L — LOW (ref 3.5–5.3)
POTASSIUM SERPL-MCNC: 3.5 MMOL/L — SIGNIFICANT CHANGE UP (ref 3.5–5.3)
POTASSIUM SERPL-MCNC: 3.5 MMOL/L — SIGNIFICANT CHANGE UP (ref 3.5–5.3)
POTASSIUM SERPL-MCNC: 4 MMOL/L — SIGNIFICANT CHANGE UP (ref 3.5–5.3)
POTASSIUM SERPL-SCNC: 2.9 MMOL/L — CRITICAL LOW (ref 3.5–5.3)
POTASSIUM SERPL-SCNC: 3.3 MMOL/L — LOW (ref 3.5–5.3)
POTASSIUM SERPL-SCNC: 3.5 MMOL/L — SIGNIFICANT CHANGE UP (ref 3.5–5.3)
POTASSIUM SERPL-SCNC: 3.5 MMOL/L — SIGNIFICANT CHANGE UP (ref 3.5–5.3)
POTASSIUM SERPL-SCNC: 4 MMOL/L — SIGNIFICANT CHANGE UP (ref 3.5–5.3)
PROT SERPL-MCNC: 5.7 G/DL — LOW (ref 6–8.3)
RBC # BLD: 2.97 M/UL — LOW (ref 3.8–5.2)
RBC # FLD: 11.9 % — SIGNIFICANT CHANGE UP (ref 10.3–14.5)
SAO2 % BLDV: 100 % — HIGH (ref 67–88)
SODIUM SERPL-SCNC: 136 MMOL/L — SIGNIFICANT CHANGE UP (ref 135–145)
SODIUM SERPL-SCNC: 138 MMOL/L — SIGNIFICANT CHANGE UP (ref 135–145)
SODIUM SERPL-SCNC: 140 MMOL/L — SIGNIFICANT CHANGE UP (ref 135–145)
SPECIMEN SOURCE: SIGNIFICANT CHANGE UP
WBC # BLD: 13.9 K/UL — HIGH (ref 3.8–10.5)
WBC # FLD AUTO: 13.9 K/UL — HIGH (ref 3.8–10.5)

## 2017-10-24 PROCEDURE — 99233 SBSQ HOSP IP/OBS HIGH 50: CPT | Mod: GC

## 2017-10-24 PROCEDURE — 99254 IP/OBS CNSLTJ NEW/EST MOD 60: CPT

## 2017-10-24 RX ORDER — DEXTROSE MONOHYDRATE, SODIUM CHLORIDE, AND POTASSIUM CHLORIDE 50; .745; 4.5 G/1000ML; G/1000ML; G/1000ML
1000 INJECTION, SOLUTION INTRAVENOUS
Qty: 0 | Refills: 0 | Status: DISCONTINUED | OUTPATIENT
Start: 2017-10-24 | End: 2017-10-24

## 2017-10-24 RX ORDER — DEXTROSE 50 % IN WATER 50 %
25 SYRINGE (ML) INTRAVENOUS ONCE
Qty: 0 | Refills: 0 | Status: COMPLETED | OUTPATIENT
Start: 2017-10-24 | End: 2017-10-24

## 2017-10-24 RX ORDER — SODIUM,POTASSIUM PHOSPHATES 278-250MG
1 POWDER IN PACKET (EA) ORAL ONCE
Qty: 0 | Refills: 0 | Status: COMPLETED | OUTPATIENT
Start: 2017-10-24 | End: 2017-10-24

## 2017-10-24 RX ORDER — POTASSIUM CHLORIDE 20 MEQ
40 PACKET (EA) ORAL ONCE
Qty: 0 | Refills: 0 | Status: COMPLETED | OUTPATIENT
Start: 2017-10-24 | End: 2017-10-24

## 2017-10-24 RX ORDER — CEFTRIAXONE 500 MG/1
2 INJECTION, POWDER, FOR SOLUTION INTRAMUSCULAR; INTRAVENOUS EVERY 24 HOURS
Qty: 0 | Refills: 0 | Status: DISCONTINUED | OUTPATIENT
Start: 2017-10-24 | End: 2017-10-27

## 2017-10-24 RX ORDER — ATORVASTATIN CALCIUM 80 MG/1
40 TABLET, FILM COATED ORAL AT BEDTIME
Qty: 0 | Refills: 0 | Status: DISCONTINUED | OUTPATIENT
Start: 2017-10-24 | End: 2017-10-27

## 2017-10-24 RX ORDER — INSULIN GLARGINE 100 [IU]/ML
21 INJECTION, SOLUTION SUBCUTANEOUS ONCE
Qty: 0 | Refills: 0 | Status: COMPLETED | OUTPATIENT
Start: 2017-10-24 | End: 2017-10-24

## 2017-10-24 RX ORDER — INSULIN LISPRO 100/ML
VIAL (ML) SUBCUTANEOUS AT BEDTIME
Qty: 0 | Refills: 0 | Status: DISCONTINUED | OUTPATIENT
Start: 2017-10-24 | End: 2017-10-25

## 2017-10-24 RX ORDER — INSULIN LISPRO 100/ML
5 VIAL (ML) SUBCUTANEOUS
Qty: 0 | Refills: 0 | Status: DISCONTINUED | OUTPATIENT
Start: 2017-10-24 | End: 2017-10-25

## 2017-10-24 RX ORDER — INSULIN GLARGINE 100 [IU]/ML
21 INJECTION, SOLUTION SUBCUTANEOUS
Qty: 0 | Refills: 0 | Status: DISCONTINUED | OUTPATIENT
Start: 2017-10-24 | End: 2017-10-27

## 2017-10-24 RX ORDER — INSULIN LISPRO 100/ML
VIAL (ML) SUBCUTANEOUS
Qty: 0 | Refills: 0 | Status: DISCONTINUED | OUTPATIENT
Start: 2017-10-24 | End: 2017-10-25

## 2017-10-24 RX ORDER — MAGNESIUM SULFATE 500 MG/ML
2 VIAL (ML) INJECTION ONCE
Qty: 0 | Refills: 0 | Status: COMPLETED | OUTPATIENT
Start: 2017-10-24 | End: 2017-10-24

## 2017-10-24 RX ORDER — POTASSIUM CHLORIDE 20 MEQ
20 PACKET (EA) ORAL
Qty: 0 | Refills: 0 | Status: COMPLETED | OUTPATIENT
Start: 2017-10-24 | End: 2017-10-24

## 2017-10-24 RX ADMIN — Medication 50 MILLIEQUIVALENT(S): at 05:11

## 2017-10-24 RX ADMIN — Medication 25 MILLILITER(S): at 02:30

## 2017-10-24 RX ADMIN — Medication 5 UNIT(S): at 19:40

## 2017-10-24 RX ADMIN — Medication 1 TABLET(S): at 18:59

## 2017-10-24 RX ADMIN — Medication 50 GRAM(S): at 05:11

## 2017-10-24 RX ADMIN — Medication 50 MILLIEQUIVALENT(S): at 03:09

## 2017-10-24 RX ADMIN — HEPARIN SODIUM 5000 UNIT(S): 5000 INJECTION INTRAVENOUS; SUBCUTANEOUS at 05:55

## 2017-10-24 RX ADMIN — Medication 40 MILLIEQUIVALENT(S): at 02:45

## 2017-10-24 RX ADMIN — Medication 1 TABLET(S): at 13:20

## 2017-10-24 RX ADMIN — Medication 100 MILLIGRAM(S): at 22:30

## 2017-10-24 RX ADMIN — INSULIN GLARGINE 21 UNIT(S): 100 INJECTION, SOLUTION SUBCUTANEOUS at 15:40

## 2017-10-24 RX ADMIN — SIMETHICONE 80 MILLIGRAM(S): 80 TABLET, CHEWABLE ORAL at 18:59

## 2017-10-24 RX ADMIN — Medication 1: at 19:40

## 2017-10-24 RX ADMIN — Medication 255 MILLIMOLE(S): at 01:29

## 2017-10-24 RX ADMIN — SIMETHICONE 80 MILLIGRAM(S): 80 TABLET, CHEWABLE ORAL at 11:00

## 2017-10-24 RX ADMIN — HEPARIN SODIUM 5000 UNIT(S): 5000 INJECTION INTRAVENOUS; SUBCUTANEOUS at 13:20

## 2017-10-24 RX ADMIN — ATORVASTATIN CALCIUM 40 MILLIGRAM(S): 80 TABLET, FILM COATED ORAL at 22:30

## 2017-10-24 RX ADMIN — HEPARIN SODIUM 5000 UNIT(S): 5000 INJECTION INTRAVENOUS; SUBCUTANEOUS at 22:30

## 2017-10-24 RX ADMIN — CEFTRIAXONE 100 GRAM(S): 500 INJECTION, POWDER, FOR SOLUTION INTRAMUSCULAR; INTRAVENOUS at 13:20

## 2017-10-24 RX ADMIN — HEPARIN SODIUM 5000 UNIT(S): 5000 INJECTION INTRAVENOUS; SUBCUTANEOUS at 22:31

## 2017-10-24 RX ADMIN — PIPERACILLIN AND TAZOBACTAM 25 GRAM(S): 4; .5 INJECTION, POWDER, LYOPHILIZED, FOR SOLUTION INTRAVENOUS at 05:55

## 2017-10-24 RX ADMIN — SENNA PLUS 2 TABLET(S): 8.6 TABLET ORAL at 22:30

## 2017-10-24 RX ADMIN — SIMETHICONE 80 MILLIGRAM(S): 80 TABLET, CHEWABLE ORAL at 22:54

## 2017-10-24 NOTE — CONSULT NOTE ADULT - ASSESSMENT
41 yo female with hx of [id number] years without complication here with 2 days of SOB found to have DKA and E.Coli bacteremia.

## 2017-10-24 NOTE — PROGRESS NOTE ADULT - ASSESSMENT
43 y/o F with DMT2 p/ to ED with SOB , chest pressure found to be in DKA in setting of noncompliance with insulin regimen. Will also r/o other causes such as infectious process, ACLS.     #Neuro  - AAox3 no focal deficits.  metabolic encephalopathy 2/2 DKA and sepsis  - expected to resolve with treatment of infection and DKA     #Pulm  - Mild tachypnea likely in part due to compensation for metabolic acidosis  - Monitor pH and pCO2, check ABG?    #Cards  - HLD in the setting of DM2.   - Pt will benefit from statin therapy in d/c  - Tachycardia likely from volume depletion in the setting of DKA. c/w IVf.     #Renal  - Creatinine stable  - c/w IVF  - Avoid nephrotoxins.     #endo  - DKA: AG open still at 19  - pt made NPO and insulin gtt restarted, c/b hypoglycemia now on D5 gtt    #ID  - Sepsis 2/2 E. coli bacteremia 2/2 pyleo?  - Pt may need TTE if persistent cx and fevers.  - c/w zosyn     #Heme  - Hb trending down likely 2/2 fluids  - If continues to trend down will need anemia workup    #GI  - NPO till AG closes.     #DVT ppx: HSQ    Carter Dasilva PGY 3  [phone]

## 2017-10-24 NOTE — CONSULT NOTE ADULT - PROBLEM SELECTOR RECOMMENDATION 3
-LDL is 119 and she is low cardiac risk so can start on simvastatin 20mg po qhs -LDL is 119 and she is low cardiac risk so can start on simvastatin 20mg po qhs. She has no desire in having any more children.

## 2017-10-24 NOTE — CONSULT NOTE ADULT - SUBJECTIVE AND OBJECTIVE BOX
HPI:                        13.0   23.4  )-----------( 361      ( 21 Oct 2017 18:39 )             45.4   10-21    121<L>  |  86<L>  |  17  ----------------------------<  987<HH>  5.7<H>   |  4<LL>  |  1.27  Ca    9.2      21 Oct 2017 18:39  Phos  3.4     10-21  Mg     2.5     10-21  TPro  9.4<H>  /  Alb  3.9  /  TBili  0.2  /  DBili  x   /  AST  14  /  ALT  16  /  AlkPhos  172<H>  10-21  PT/INR - ( 21 Oct 2017 18:39 )   PT: 13.0 sec;   INR: 1.20 ratio    PTT - ( 21 Oct 2017 18:39 )  PTT:34.3 sec (21 Oct 2017 19:51)      PAST MEDICAL & SURGICAL HISTORY:  Diabetes  Anemia  Diabetes mellitus type II  No significant past surgical history  S/P laparoscopic procedure: D&amp;C Hysteroscopy, Laparoscopy, RSO/ Excision Right Tubo-ovarian abscess 7/19  C section: 2008      FAMILY HISTORY:  DM: mother, mat GM, Mat aunts/uncle    Social History:  Tobacco: never  ETOH: never  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    Outpatient Medications:    MEDICATIONS  (STANDING):  cefTRIAXone   IVPB 2 Gram(s) IV Intermittent every 24 hours  dextrose 5% + sodium chloride 0.45% with potassium chloride 20 mEq/L 1000 milliLiter(s) (150 mL/Hr) IV Continuous <Continuous>  docusate sodium 100 milliGRAM(s) Oral three times a day  heparin  Injectable 5000 Unit(s) SubCutaneous every 8 hours  influenza   Vaccine 0.5 milliLiter(s) IntraMuscular once  insulin glargine Injectable (LANTUS) 21 Unit(s) SubCutaneous <User Schedule>  insulin Infusion 2 Unit(s)/Hr (2 mL/Hr) IV Continuous <Continuous>  insulin lispro (HumaLOG) corrective regimen sliding scale   SubCutaneous three times a day before meals  insulin lispro (HumaLOG) corrective regimen sliding scale   SubCutaneous at bedtime  insulin lispro Injectable (HumaLOG) 5 Unit(s) SubCutaneous three times a day before meals  polyethylene glycol 3350 17 Gram(s) Oral daily  potassium acid phosphate/sodium acid phosphate tablet (K-PHOS No. 2) 1 Tablet(s) Oral once  senna 2 Tablet(s) Oral at bedtime  simethicone drops 80 milliGRAM(s) Oral every 4 hours    MEDICATIONS  (PRN):  acetaminophen   Tablet 650 milliGRAM(s) Oral every 6 hours PRN For Temp greater than 38 C (100.4 F)  ondansetron Injectable 4 milliGRAM(s) IV Push every 6 hours PRN Nausea and/or Vomiting      Allergies  No Known Allergies    Review of Systems:  Constitutional: No fever/chills  Neuro: no neuropathy or headache  Cardiovascular: No chest pain, palpitations  Respiratory: No SOB, no cough  GI: No nausea, vomiting,   : No dysuria, hematuria  Psych: no depression, +fatigue  Endocrine: +polyuria, polydipsia, nocturia  ALL OTHER SYSTEMS REVIEWED AND NEGATIVE    PHYSICAL EXAM:  VITALS: T(C): 37.3 (10-24-17 @ 16:00)  T(F): 99.1 (10-24-17 @ 16:00), Max: 100 (10-23-17 @ 20:00)  HR: 101 (10-24-17 @ 16:00) (98 - 122)  BP: 107/64 (10-24-17 @ 16:00) (104/56 - 144/105)  RR:  (19 - 36)  SpO2:  (93% - 100%)  Wt(kg): --  GENERAL: NAD, well-groomed, well-developed  EYES: No proptosis, no lid lag, anicteric  HEENT:  Atraumatic, Normocephalic, moist mucous membranes  THYROID: Normal size, no palpable nodules  RESPIRATORY: Clear to auscultation bilaterally; No rales, rhonchi, wheezing, or rubs  CARDIOVASCULAR: Regular rate and rhythm; No murmurs; no peripheral edema  GI: Soft, nontender, non distended, normal bowel sounds  SKIN: Dry, intact, No rashes or lesions  PSYCH: reactive affect, euthymic mood      POCT Blood Glucose.: 183 mg/dL (10-24-17 @ 16:24)  POCT Blood Glucose.: 197 mg/dL (10-24-17 @ 15:01)  POCT Blood Glucose.: 202 mg/dL (10-24-17 @ 14:15)  POCT Blood Glucose.: 191 mg/dL (10-24-17 @ 13:08)  POCT Blood Glucose.: 177 mg/dL (10-24-17 @ 12:01)  POCT Blood Glucose.: 173 mg/dL (10-24-17 @ 11:01)  POCT Blood Glucose.: 157 mg/dL (10-24-17 @ 10:04)  POCT Blood Glucose.: 160 mg/dL (10-24-17 @ 09:04)  POCT Blood Glucose.: 101 mg/dL (10-24-17 @ 08:18)  POCT Blood Glucose.: 62 mg/dL (10-24-17 @ 07:52)  POCT Blood Glucose.: 62 mg/dL (10-24-17 @ 06:01)  POCT Blood Glucose.: 57 mg/dL (10-24-17 @ 06:00)  POCT Blood Glucose.: 166 mg/dL (10-24-17 @ 04:00)  POCT Blood Glucose.: 164 mg/dL (10-24-17 @ 03:05)  POCT Blood Glucose.: 90 mg/dL (10-24-17 @ 02:29)  POCT Blood Glucose.: 60 mg/dL (10-24-17 @ 02:04)  POCT Blood Glucose.: 128 mg/dL (10-24-17 @ 00:19)  POCT Blood Glucose.: 249 mg/dL (10-23-17 @ 22:12)  POCT Blood Glucose.: 268 mg/dL (10-23-17 @ 20:10)  POCT Blood Glucose.: 218 mg/dL (10-23-17 @ 18:32)  POCT Blood Glucose.: 165 mg/dL (10-23-17 @ 16:23)  POCT Blood Glucose.: 216 mg/dL (10-23-17 @ 14:37)  POCT Blood Glucose.: 212 mg/dL (10-23-17 @ 12:27)  POCT Blood Glucose.: 182 mg/dL (10-23-17 @ 09:55)  POCT Blood Glucose.: 166 mg/dL (10-23-17 @ 08:53)  POCT Blood Glucose.: 167 mg/dL (10-23-17 @ 07:55)  POCT Blood Glucose.: 186 mg/dL (10-23-17 @ 06:54)  POCT Blood Glucose.: 182 mg/dL (10-23-17 @ 06:01)  POCT Blood Glucose.: 137 mg/dL (10-23-17 @ 05:08)  POCT Blood Glucose.: 180 mg/dL (10-23-17 @ 04:01)  POCT Blood Glucose.: 200 mg/dL (10-23-17 @ 03:01)  POCT Blood Glucose.: 241 mg/dL (10-23-17 @ 01:56)  POCT Blood Glucose.: 201 mg/dL (10-23-17 @ 01:07)  POCT Blood Glucose.: 189 mg/dL (10-23-17 @ 00:40)  POCT Blood Glucose.: 145 mg/dL (10-22-17 @ 23:42)  POCT Blood Glucose.: 72 mg/dL (10-22-17 @ 22:59)  POCT Blood Glucose.: 74 mg/dL (10-22-17 @ 22:08)  POCT Blood Glucose.: 114 mg/dL (10-22-17 @ 21:14)  POCT Blood Glucose.: 200 mg/dL (10-22-17 @ 20:03)  POCT Blood Glucose.: 215 mg/dL (10-22-17 @ 18:53)  POCT Blood Glucose.: 235 mg/dL (10-22-17 @ 18:06)  POCT Blood Glucose.: 212 mg/dL (10-22-17 @ 17:15)  POCT Blood Glucose.: 199 mg/dL (10-22-17 @ 16:05)  POCT Blood Glucose.: 162 mg/dL (10-22-17 @ 14:52)  POCT Blood Glucose.: 143 mg/dL (10-22-17 @ 14:14)  POCT Blood Glucose.: 158 mg/dL (10-22-17 @ 13:06)  POCT Blood Glucose.: 173 mg/dL (10-22-17 @ 12:03)  POCT Blood Glucose.: 191 mg/dL (10-22-17 @ 11:02)  POCT Blood Glucose.: 208 mg/dL (10-22-17 @ 10:04)  POCT Blood Glucose.: 211 mg/dL (10-22-17 @ 09:13)  POCT Blood Glucose.: 206 mg/dL (10-22-17 @ 08:08)  POCT Blood Glucose.: 184 mg/dL (10-22-17 @ 06:54)  POCT Blood Glucose.: 198 mg/dL (10-22-17 @ 05:59)  POCT Blood Glucose.: 217 mg/dL (10-22-17 @ 03:54)  POCT Blood Glucose.: 149 mg/dL (10-22-17 @ 03:03)  POCT Blood Glucose.: 125 mg/dL (10-22-17 @ 02:03)  POCT Blood Glucose.: 212 mg/dL (10-22-17 @ 01:05)  POCT Blood Glucose.: 267 mg/dL (10-21-17 @ 23:52)  POCT Blood Glucose.: 367 mg/dL (10-21-17 @ 23:00)  POCT Blood Glucose.: 486 mg/dL (10-21-17 @ 22:01)  POCT Blood Glucose.: >600 mg/dL (10-21-17 @ 20:37)  POCT Blood Glucose.: >600 mg/dL (10-21-17 @ 20:24)  POCT Blood Glucose.: >600 mg/dL (10-21-17 @ 20:23)  POCT Blood Glucose.: >600 mg/dL (10-21-17 @ 19:37)  POCT Blood Glucose.: >600 mg/dL (10-21-17 @ 19:36)  POCT Blood Glucose.: >600 mg/dL (10-21-17 @ 18:06)                            8.6    13.9  )-----------( 243      ( 24 Oct 2017 04:09 )             25.0       10-24    136  |  101  |  2<L>  ----------------------------<  215<H>  4.0   |  23  |  0.43<L>    EGFR if : 144  EGFR if non : 125    Ca    7.8<L>      10-24  Mg     2.0     10-24  Phos  1.9     10-24  TPro  5.7<L>  /  Alb  2.3<L>  /  TBili  0.2  /  DBili  x   /  AST  39  /  ALT  38  /  AlkPhos  173<H>  10-24  Hemoglobin A1C, Whole Blood: 14.9 % <H> [4.0 - 5.6] (10-22-17 @ 04:53)  10-22 Chol 178  HDL 16<L> Trig 216<H> HPI: 41 yo female with hx of GDMx2 (1st baby 8lb, 3 oz & 2nd baby: 9lb, 15oz) and T2DM uncontrolled x 12 years with no complications here with 2 days of SOB found to have severe metabolic acidosis - HCO3- was 4 due to DKA and E Coli bacteremia Patient admits to being in "diabetic denial", thus she has been taking her insulin sporadically for the past several months. Her DM is managed by her PCP Dr. Marcos Haas in [place]. He has her on Lantus 30 units sq qhs and Humalog 6 units tid-ac. The Humalog was difficult for her to remember as she would often leave it at home. She also notes that her job is demanding so she does not always stop for her lunch break. B'fast is usually on the way to work around 8/9am: bagel or buck/egg/cheese on a roll. Lunch is between 1 and 2: jerk chicken and vegetables from a local [facility] restaurant - she declines the rice and peas. Dinner is about 630PM: she will bake a meat with veggies and a starch. She often has a PBandJ sandwich and milk around 10PM. Her average duration of sleep is 3 hours as she stays up cleaning her house at night.   On admission her Glucose was 987 with an AG of 31 and large ketones.                        13.0   23.4  )-----------( 361      ( 21 Oct 2017 18:39 )             45.4   10-21    121<L>  |  86<L>  |  17  ----------------------------<  987<HH>  5.7<H>   |  4<LL>  |  1.27  Ca    9.2      21 Oct 2017 18:39  Phos  3.4     10-21  Mg     2.5     10-21  TPro  9.4<H>  /  Alb  3.9  /  TBili  0.2  /  DBili  x   /  AST  14  /  ALT  16  /  AlkPhos  172<H>  10-21  PT/INR - ( 21 Oct 2017 18:39 )   PT: 13.0 sec;   INR: 1.20 ratio    PTT - ( 21 Oct 2017 18:39 )  PTT:34.3 sec (21 Oct 2017 19:51)      PAST MEDICAL & SURGICAL HISTORY:  Diabetes  Anemia  Diabetes mellitus type II  No significant past surgical history  S/P laparoscopic procedure: D&amp;C Hysteroscopy, Laparoscopy, RSO/ Excision Right Tubo-ovarian abscess 7/19  C section: 2008      FAMILY HISTORY:  DM: mother, mat GM, Mat aunts/uncle    Social History:  Tobacco: never  ETOH: never  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    Outpatient Medications:    MEDICATIONS  (STANDING):  cefTRIAXone   IVPB 2 Gram(s) IV Intermittent every 24 hours  dextrose 5% + sodium chloride 0.45% with potassium chloride 20 mEq/L 1000 milliLiter(s) (150 mL/Hr) IV Continuous <Continuous>  docusate sodium 100 milliGRAM(s) Oral three times a day  heparin  Injectable 5000 Unit(s) SubCutaneous every 8 hours  influenza   Vaccine 0.5 milliLiter(s) IntraMuscular once  insulin glargine Injectable (LANTUS) 21 Unit(s) SubCutaneous <User Schedule>  insulin Infusion 2 Unit(s)/Hr (2 mL/Hr) IV Continuous <Continuous>  insulin lispro (HumaLOG) corrective regimen sliding scale   SubCutaneous three times a day before meals  insulin lispro (HumaLOG) corrective regimen sliding scale   SubCutaneous at bedtime  insulin lispro Injectable (HumaLOG) 5 Unit(s) SubCutaneous three times a day before meals  polyethylene glycol 3350 17 Gram(s) Oral daily  potassium acid phosphate/sodium acid phosphate tablet (K-PHOS No. 2) 1 Tablet(s) Oral once  senna 2 Tablet(s) Oral at bedtime  simethicone drops 80 milliGRAM(s) Oral every 4 hours    MEDICATIONS  (PRN):  acetaminophen   Tablet 650 milliGRAM(s) Oral every 6 hours PRN For Temp greater than 38 C (100.4 F)  ondansetron Injectable 4 milliGRAM(s) IV Push every 6 hours PRN Nausea and/or Vomiting      Allergies  No Known Allergies    Review of Systems:  Constitutional: No fever/chills  Neuro: no neuropathy or headache  Cardiovascular: No chest pain, palpitations  Respiratory: No SOB, no cough  GI: No nausea, vomiting,   : No dysuria, hematuria  Psych: no depression, +fatigue  Endocrine: +polyuria, polydipsia, nocturia  ALL OTHER SYSTEMS REVIEWED AND NEGATIVE    PHYSICAL EXAM:  VITALS: T(C): 37.3 (10-24-17 @ 16:00)  T(F): 99.1 (10-24-17 @ 16:00), Max: 100 (10-23-17 @ 20:00)  HR: 101 (10-24-17 @ 16:00) (98 - 122)  BP: 107/64 (10-24-17 @ 16:00) (104/56 - 144/105)  RR:  (19 - 36)  SpO2:  (93% - 100%)  Wt(kg): --  GENERAL: NAD, well-groomed, well-developed  EYES: No proptosis, no lid lag, anicteric  HEENT:  Atraumatic, Normocephalic, moist mucous membranes  THYROID: 15g, no palpable nodules  RESPIRATORY: Clear to auscultation bilaterally; No rales, rhonchi, wheezing, or rubs  CARDIOVASCULAR: Regular rate and rhythm; No murmurs; no peripheral edema  GI: Soft, nontender, non distended, normal bowel sounds  SKIN: Dry, intact, No rashes or lesions on feet b/l  PSYCH: reactive affect, euthymic mood      POCT Blood Glucose.: 183 mg/dL (10-24-17 @ 16:24)  POCT Blood Glucose.: 197 mg/dL (10-24-17 @ 15:01)  POCT Blood Glucose.: 202 mg/dL (10-24-17 @ 14:15)  POCT Blood Glucose.: 191 mg/dL (10-24-17 @ 13:08)  POCT Blood Glucose.: 177 mg/dL (10-24-17 @ 12:01)  POCT Blood Glucose.: 173 mg/dL (10-24-17 @ 11:01)  POCT Blood Glucose.: 157 mg/dL (10-24-17 @ 10:04)  POCT Blood Glucose.: 160 mg/dL (10-24-17 @ 09:04)  POCT Blood Glucose.: 101 mg/dL (10-24-17 @ 08:18)  POCT Blood Glucose.: 62 mg/dL (10-24-17 @ 07:52)  POCT Blood Glucose.: 62 mg/dL (10-24-17 @ 06:01)  POCT Blood Glucose.: 57 mg/dL (10-24-17 @ 06:00)  POCT Blood Glucose.: 166 mg/dL (10-24-17 @ 04:00)  POCT Blood Glucose.: 164 mg/dL (10-24-17 @ 03:05)  POCT Blood Glucose.: 90 mg/dL (10-24-17 @ 02:29)  POCT Blood Glucose.: 60 mg/dL (10-24-17 @ 02:04)  POCT Blood Glucose.: 128 mg/dL (10-24-17 @ 00:19)  POCT Blood Glucose.: 249 mg/dL (10-23-17 @ 22:12)  POCT Blood Glucose.: 268 mg/dL (10-23-17 @ 20:10)  POCT Blood Glucose.: 218 mg/dL (10-23-17 @ 18:32)  POCT Blood Glucose.: 165 mg/dL (10-23-17 @ 16:23)  POCT Blood Glucose.: 216 mg/dL (10-23-17 @ 14:37)  POCT Blood Glucose.: 212 mg/dL (10-23-17 @ 12:27)  POCT Blood Glucose.: 182 mg/dL (10-23-17 @ 09:55)  POCT Blood Glucose.: 166 mg/dL (10-23-17 @ 08:53)  POCT Blood Glucose.: 167 mg/dL (10-23-17 @ 07:55)  POCT Blood Glucose.: 186 mg/dL (10-23-17 @ 06:54)  POCT Blood Glucose.: 182 mg/dL (10-23-17 @ 06:01)  POCT Blood Glucose.: 137 mg/dL (10-23-17 @ 05:08)  POCT Blood Glucose.: 180 mg/dL (10-23-17 @ 04:01)  POCT Blood Glucose.: 200 mg/dL (10-23-17 @ 03:01)  POCT Blood Glucose.: 241 mg/dL (10-23-17 @ 01:56)  POCT Blood Glucose.: 201 mg/dL (10-23-17 @ 01:07)  POCT Blood Glucose.: 189 mg/dL (10-23-17 @ 00:40)  POCT Blood Glucose.: 145 mg/dL (10-22-17 @ 23:42)  POCT Blood Glucose.: 72 mg/dL (10-22-17 @ 22:59)  POCT Blood Glucose.: 74 mg/dL (10-22-17 @ 22:08)  POCT Blood Glucose.: 114 mg/dL (10-22-17 @ 21:14)  POCT Blood Glucose.: 200 mg/dL (10-22-17 @ 20:03)  POCT Blood Glucose.: 215 mg/dL (10-22-17 @ 18:53)  POCT Blood Glucose.: 235 mg/dL (10-22-17 @ 18:06)  POCT Blood Glucose.: 212 mg/dL (10-22-17 @ 17:15)  POCT Blood Glucose.: 199 mg/dL (10-22-17 @ 16:05)  POCT Blood Glucose.: 162 mg/dL (10-22-17 @ 14:52)  POCT Blood Glucose.: 143 mg/dL (10-22-17 @ 14:14)  POCT Blood Glucose.: 158 mg/dL (10-22-17 @ 13:06)  POCT Blood Glucose.: 173 mg/dL (10-22-17 @ 12:03)  POCT Blood Glucose.: 191 mg/dL (10-22-17 @ 11:02)  POCT Blood Glucose.: 208 mg/dL (10-22-17 @ 10:04)  POCT Blood Glucose.: 211 mg/dL (10-22-17 @ 09:13)  POCT Blood Glucose.: 206 mg/dL (10-22-17 @ 08:08)  POCT Blood Glucose.: 184 mg/dL (10-22-17 @ 06:54)  POCT Blood Glucose.: 198 mg/dL (10-22-17 @ 05:59)  POCT Blood Glucose.: 217 mg/dL (10-22-17 @ 03:54)  POCT Blood Glucose.: 149 mg/dL (10-22-17 @ 03:03)  POCT Blood Glucose.: 125 mg/dL (10-22-17 @ 02:03)  POCT Blood Glucose.: 212 mg/dL (10-22-17 @ 01:05)  POCT Blood Glucose.: 267 mg/dL (10-21-17 @ 23:52)  POCT Blood Glucose.: 367 mg/dL (10-21-17 @ 23:00)  POCT Blood Glucose.: 486 mg/dL (10-21-17 @ 22:01)  POCT Blood Glucose.: >600 mg/dL (10-21-17 @ 20:37)  POCT Blood Glucose.: >600 mg/dL (10-21-17 @ 20:24)  POCT Blood Glucose.: >600 mg/dL (10-21-17 @ 20:23)  POCT Blood Glucose.: >600 mg/dL (10-21-17 @ 19:37)  POCT Blood Glucose.: >600 mg/dL (10-21-17 @ 19:36)  POCT Blood Glucose.: >600 mg/dL (10-21-17 @ 18:06)                            8.6    13.9  )-----------( 243      ( 24 Oct 2017 04:09 )             25.0       10-24    136  |  101  |  2<L>  ----------------------------<  215<H>  4.0   |  23  |  0.43<L>    EGFR if : 144  EGFR if non : 125    Ca    7.8<L>      10-24  Mg     2.0     10-24  Phos  1.9     10-24  TPro  5.7<L>  /  Alb  2.3<L>  /  TBili  0.2  /  DBili  x   /  AST  39  /  ALT  38  /  AlkPhos  173<H>  10-24  Hemoglobin A1C, Whole Blood: 14.9 % <H> [4.0 - 5.6] (10-22-17 @ 04:53)  10-22 Chol 178  HDL 16<L> Trig 216<H>

## 2017-10-24 NOTE — PROGRESS NOTE ADULT - SUBJECTIVE AND OBJECTIVE BOX
MICU Transfer Note    Transfer from: MICU  Transfer to:  ( x ) Medicine    (  ) Telemetry    (  ) RCU    (  ) Palliative    (  ) Stroke Unit    (  ) _______________  Accepting physican:      MICU COURSE:  43 y/o F with DMT2 p/ to ED with SOB , chest pressure found to be in DKA in setting of noncompliance with insulin regimen. Will also r/o other causes such as infectious process, ACS. Started on insulin drip/ DKA protocol. On day 2 of admission, he insulin gtt was titrated down and her AG closed. She was bridged to Lantus 30 with premeal 10 and resume diet. She was also noted to have GNR bacteremia, and started on zosyn. She received   O/N: Off insulin drip. Potassium 3.0, given IV and PO potassium repletion. Opening anion gap required restart of insulin drip initially at 2. ABG at 5:32 with gluc 142, ionized rhea 1.07 and potassium 3.1. Given calcium gluconate, potassium chloride. Insulin drip set to 3. D5W 1/2 NS at 100 ml/hr. drip at 2 ml/hr  10/23: c/w [email or web address] rest for today. continue inuslin gtt. pending official read of CT. trialo   10/24: CT showed ? R pyelo. AG 15 to 19 overnight, restarted insulin gtt.  Positive blood culture for E. coli, afebrile. Will consult endo . E coli pan sensitive, deescalate to ceftriaxone. Will give Lantus 21un and humalog 5un TID. Lantus given at 4PM. Gap decreased to 12.Gtt off at 6PM          ASSESSMENT & PLAN:         For Follow-Up:          Vital Signs Last 24 Hrs  T(C): 37.1 (24 Oct 2017 19:18), Max: 37.7 (24 Oct 2017 04:00)  T(F): 98.7 (24 Oct 2017 19:18), Max: 99.8 (24 Oct 2017 04:00)  HR: 100 (24 Oct 2017 21:00) (98 - 122)  BP: 130/77 (24 Oct 2017 21:00) (104/56 - 146/81)  BP(mean): 97 (24 Oct 2017 21:00) (75 - 119)  RR: 18 (24 Oct 2017 21:00) (18 - 36)  SpO2: 100% (24 Oct 2017 21:00) (98% - 100%)  I&O's Summary    23 Oct 2017 07:01  -  24 Oct 2017 07:00  --------------------------------------------------------  IN: 3614 mL / OUT: 300 mL / NET: 3314 mL    24 Oct 2017 07:01  -  24 Oct 2017 21:46  --------------------------------------------------------  IN: 1858 mL / OUT: 1800 mL / NET: 58 mL          MEDICATIONS  (STANDING):  atorvastatin 40 milliGRAM(s) Oral at bedtime  cefTRIAXone   IVPB 2 Gram(s) IV Intermittent every 24 hours  docusate sodium 100 milliGRAM(s) Oral three times a day  heparin  Injectable 5000 Unit(s) SubCutaneous every 8 hours  influenza   Vaccine 0.5 milliLiter(s) IntraMuscular once  insulin glargine Injectable (LANTUS) 21 Unit(s) SubCutaneous <User Schedule>  insulin lispro (HumaLOG) corrective regimen sliding scale   SubCutaneous three times a day before meals  insulin lispro (HumaLOG) corrective regimen sliding scale   SubCutaneous at bedtime  insulin lispro Injectable (HumaLOG) 5 Unit(s) SubCutaneous three times a day before meals  polyethylene glycol 3350 17 Gram(s) Oral daily  senna 2 Tablet(s) Oral at bedtime  simethicone drops 80 milliGRAM(s) Oral every 4 hours    MEDICATIONS  (PRN):  acetaminophen   Tablet 650 milliGRAM(s) Oral every 6 hours PRN For Temp greater than 38 C (100.4 F)  ondansetron Injectable 4 milliGRAM(s) IV Push every 6 hours PRN Nausea and/or Vomiting        LABS                                            8.6                   Neurophils% (auto):   x      (10-24 @ 04:09):    13.9 )-----------(243          Lymphocytes% (auto):  x                                             25.0                   Eosinphils% (auto):   x        Manual%: Neutrophils x    ; Lymphocytes x    ; Eosinophils x    ; Bands%: x    ; Blasts x                                    136    |  101    |  2                   Calcium: 7.8   / iCa: x      (10-24 @ 15:39)    ----------------------------<  215       Magnesium: 2.0                              4.0     |  23     |  0.43             Phosphorous: 1.9      TPro  5.7    /  Alb  2.3    /  TBili  0.2    /  DBili  x      /  AST  39     /  ALT  38     /  AlkPhos  173    24 Oct 2017 04:09

## 2017-10-24 NOTE — PROGRESS NOTE ADULT - SUBJECTIVE AND OBJECTIVE BOX
CHIEF COMPLAINT:    Interval Events: Hypoglycemic overnight to 35 on CMP. Asymptomic during episode. Last FSG 90. Currently on insulin gtt 2un and D5 at 150cc/hr. Complains of continued nausea.     REVIEW OF SYSTEMS:  Constitutional: Endorses chills. No fevers  Eyes: No blurry vision or eye pain  ENT: No sinus symptoms  CV: No CP or palpitations  Resp: No SOB  GI: No abdominal pain. Endorses nausea  : Endorses urinary urgency  MSK: No muscle or joint pain  Integumentary: No rashes  Neurological: No focal deficits  Psychiatric: No issues    [X] All other systems negative    OBJECTIVE:  ICU Vital Signs Last 24 Hrs  T(C): 37.7 (24 Oct 2017 04:00), Max: 37.8 (23 Oct 2017 20:00)  T(F): 99.8 (24 Oct 2017 04:00), Max: 100 (23 Oct 2017 20:00)  HR: 104 (24 Oct 2017 07:00) (100 - 122)  BP: 104/56 (24 Oct 2017 07:00) (101/65 - 144/105)  BP(mean): 75 (24 Oct 2017 07:00) (75 - 119)  ABP: --  ABP(mean): --  RR: 21 (24 Oct 2017 07:00) (19 - 36)  SpO2: 99% (24 Oct 2017 07:00) (93% - 100%)        10-23 @ 07:01  -  10-24 @ 07:00  --------------------------------------------------------  IN: 3614 mL / OUT: 300 mL / NET: 3314 mL      CAPILLARY BLOOD GLUCOSE  62 (24 Oct 2017 06:00)      POCT Blood Glucose.: 62 mg/dL (24 Oct 2017 06:01)      PHYSICAL EXAM:  General:   HEENT:   Lymph Nodes:  Neck:   Respiratory:   Cardiovascular:   Abdomen:   Extremities:   Skin:   Neurological:  Psychiatry:    HOSPITAL MEDICATIONS:  MEDICATIONS  (STANDING):  dextrose 5% + sodium chloride 0.45% with potassium chloride 20 mEq/L 1000 milliLiter(s) (150 mL/Hr) IV Continuous <Continuous>  docusate sodium 100 milliGRAM(s) Oral three times a day  heparin  Injectable 5000 Unit(s) SubCutaneous every 8 hours  influenza   Vaccine 0.5 milliLiter(s) IntraMuscular once  insulin Infusion 2 Unit(s)/Hr (2 mL/Hr) IV Continuous <Continuous>  piperacillin/tazobactam IVPB. 3.375 Gram(s) IV Intermittent every 8 hours  polyethylene glycol 3350 17 Gram(s) Oral daily  senna 2 Tablet(s) Oral at bedtime  simethicone drops 80 milliGRAM(s) Oral every 4 hours    MEDICATIONS  (PRN):  acetaminophen   Tablet 650 milliGRAM(s) Oral every 6 hours PRN For Temp greater than 38 C (100.4 F)  ondansetron Injectable 4 milliGRAM(s) IV Push every 6 hours PRN Nausea and/or Vomiting      LABS:                        8.6    13.9  )-----------( 243      ( 24 Oct 2017 04:09 )             25.0     10-24    138  |  103  |  2<L>  ----------------------------<  151<H>  3.3<L>   |  16<L>  |  0.58    Ca    7.8<L>      24 Oct 2017 04:09  Phos  3.7     10-24  Mg     1.7     10-24    TPro  5.7<L>  /  Alb  2.3<L>  /  TBili  0.2  /  DBili  x   /  AST  39  /  ALT  38  /  AlkPhos  173<H>  10-24          Venous Blood Gas:  10-24 @ 04:07  7.46/26/131/18/100  VBG Lactate: 3.8  Venous Blood Gas:  10-23 @ 15:16  7.44/25/105/17/99  VBG Lactate: 1.9  Venous Blood Gas:  10-23 @ 08:23  7.34/32/27/17/53  VBG Lactate: 2.3  Venous Blood Gas:  10-23 @ 05:32  7.34/31/24/16/43  VBG Lactate: 2.7  Venous Blood Gas:  10-22 @ 17:48  7.28/32/29/14/60  VBG Lactate: --  Venous Blood Gas:  10-22 @ 16:27  7.26/30/41/13/80  VBG Lactate: --  Venous Blood Gas:  10-22 @ 13:14  7.25/32/26/13/54  VBG Lactate: --  Venous Blood Gas:  10-22 @ 09:26  7.21/33/28/13/58  VBG Lactate: --      MICROBIOLOGY:     RADIOLOGY:  [ ] Reviewed and interpreted by me    EKG: CHIEF COMPLAINT:    Interval Events: Hypoglycemic overnight to 35 on CMP. Asymptomic during episode. Last FSG 90. Currently on insulin gtt 2un and D5 at 150cc/hr. Complains of continued nausea.     REVIEW OF SYSTEMS:  Constitutional: Endorses chills. No fevers  Eyes: No blurry vision or eye pain  ENT: No sinus symptoms  CV: No CP or palpitations  Resp: No SOB  GI: No abdominal pain. Endorses nausea  : Endorses urinary urgency  MSK: No muscle or joint pain  Integumentary: No rashes  Neurological: No focal deficits  Psychiatric: No issues    [X] All other systems negative    OBJECTIVE:  ICU Vital Signs Last 24 Hrs  T(C): 37.7 (24 Oct 2017 04:00), Max: 37.8 (23 Oct 2017 20:00)  T(F): 99.8 (24 Oct 2017 04:00), Max: 100 (23 Oct 2017 20:00)  HR: 104 (24 Oct 2017 07:00) (100 - 122)  BP: 104/56 (24 Oct 2017 07:00) (101/65 - 144/105)  BP(mean): 75 (24 Oct 2017 07:00) (75 - 119)  ABP: --  ABP(mean): --  RR: 21 (24 Oct 2017 07:00) (19 - 36)  SpO2: 99% (24 Oct 2017 07:00) (93% - 100%)        10-23 @ 07:01  -  10-24 @ 07:00  --------------------------------------------------------  IN: 3614 mL / OUT: 300 mL / NET: 3314 mL      CAPILLARY BLOOD GLUCOSE  62 (24 Oct 2017 06:00)      POCT Blood Glucose.: 62 mg/dL (24 Oct 2017 06:01)      PHYSICAL EXAM:  General: NAD  HEENT: EOMI, PERRLA  Lymph Nodes: No cervical or supraclavicular LAD  Neck: Supple  Respiratory: CTAB  Cardiovascular: S1, S2 tachycardic  Abdomen: Soft, NTND, BS+  Extremities: No clubbin/cyanosis/edema  Skin: No rash  Neurological: AAO x3    HOSPITAL MEDICATIONS:  MEDICATIONS  (STANDING):  dextrose 5% + sodium chloride 0.45% with potassium chloride 20 mEq/L 1000 milliLiter(s) (150 mL/Hr) IV Continuous <Continuous>  docusate sodium 100 milliGRAM(s) Oral three times a day  heparin  Injectable 5000 Unit(s) SubCutaneous every 8 hours  influenza   Vaccine 0.5 milliLiter(s) IntraMuscular once  insulin Infusion 2 Unit(s)/Hr (2 mL/Hr) IV Continuous <Continuous>  piperacillin/tazobactam IVPB. 3.375 Gram(s) IV Intermittent every 8 hours  polyethylene glycol 3350 17 Gram(s) Oral daily  senna 2 Tablet(s) Oral at bedtime  simethicone drops 80 milliGRAM(s) Oral every 4 hours    MEDICATIONS  (PRN):  acetaminophen   Tablet 650 milliGRAM(s) Oral every 6 hours PRN For Temp greater than 38 C (100.4 F)  ondansetron Injectable 4 milliGRAM(s) IV Push every 6 hours PRN Nausea and/or Vomiting      LABS:                        8.6    13.9  )-----------( 243      ( 24 Oct 2017 04:09 )             25.0     10-24    138  |  103  |  2<L>  ----------------------------<  151<H>  3.3<L>   |  16<L>  |  0.58    Ca    7.8<L>      24 Oct 2017 04:09  Phos  3.7     10-24  Mg     1.7     10-24    TPro  5.7<L>  /  Alb  2.3<L>  /  TBili  0.2  /  DBili  x   /  AST  39  /  ALT  38  /  AlkPhos  173<H>  10-24          Venous Blood Gas:  10-24 @ 04:07  7.46/26/131/18/100  VBG Lactate: 3.8  Venous Blood Gas:  10-23 @ 15:16  7.44/25/105/17/99  VBG Lactate: 1.9  Venous Blood Gas:  10-23 @ 08:23  7.34/32/27/17/53  VBG Lactate: 2.3  Venous Blood Gas:  10-23 @ 05:32  7.34/31/24/16/43  VBG Lactate: 2.7  Venous Blood Gas:  10-22 @ 17:48  7.28/32/29/14/60  VBG Lactate: --  Venous Blood Gas:  10-22 @ 16:27  7.26/30/41/13/80  VBG Lactate: --  Venous Blood Gas:  10-22 @ 13:14  7.25/32/26/13/54  VBG Lactate: --  Venous Blood Gas:  10-22 @ 09:26  7.21/33/28/13/58  VBG Lactate: --      MICROBIOLOGY:     RADIOLOGY:  [ ] Reviewed and interpreted by me    EKG:

## 2017-10-24 NOTE — CHART NOTE - NSCHARTNOTEFT_GEN_A_CORE
MICU Transfer Note    Transfer from: MICU  Transfer to:  ( x ) Medicine    (  ) Telemetry    (  ) RCU    (  ) Palliative    (  ) Stroke Unit    (  ) _______________  [facility]          ASSESSMENT & PLAN:         For Follow-Up:          Vital Signs Last 24 Hrs  T(C): 37.1 (24 Oct 2017 19:18), Max: 37.7 (24 Oct 2017 04:00)  T(F): 98.7 (24 Oct 2017 19:18), Max: 99.8 (24 Oct 2017 04:00)  HR: 100 (24 Oct 2017 21:00) (98 - 122)  BP: 130/77 (24 Oct 2017 21:00) (104/56 - 146/81)  BP(mean): 97 (24 Oct 2017 21:00) (75 - 119)  RR: 18 (24 Oct 2017 21:00) (18 - 36)  SpO2: 100% (24 Oct 2017 21:00) (98% - 100%)  I&O's Summary    23 Oct 2017 07:01  -  24 Oct 2017 07:00  --------------------------------------------------------  IN: 3614 mL / OUT: 300 mL / NET: 3314 mL    24 Oct 2017 07:01  -  24 Oct 2017 21:57  --------------------------------------------------------  IN: 1858 mL / OUT: 1800 mL / NET: 58 mL          MEDICATIONS  (STANDING):  atorvastatin 40 milliGRAM(s) Oral at bedtime  cefTRIAXone   IVPB 2 Gram(s) IV Intermittent every 24 hours  docusate sodium 100 milliGRAM(s) Oral three times a day  heparin  Injectable 5000 Unit(s) SubCutaneous every 8 hours  influenza   Vaccine 0.5 milliLiter(s) IntraMuscular once  insulin glargine Injectable (LANTUS) 21 Unit(s) SubCutaneous <User Schedule>  insulin lispro (HumaLOG) corrective regimen sliding scale   SubCutaneous three times a day before meals  insulin lispro (HumaLOG) corrective regimen sliding scale   SubCutaneous at bedtime  insulin lispro Injectable (HumaLOG) 5 Unit(s) SubCutaneous three times a day before meals  polyethylene glycol 3350 17 Gram(s) Oral daily  senna 2 Tablet(s) Oral at bedtime  simethicone drops 80 milliGRAM(s) Oral every 4 hours    MEDICATIONS  (PRN):  acetaminophen   Tablet 650 milliGRAM(s) Oral every 6 hours PRN For Temp greater than 38 C (100.4 F)  ondansetron Injectable 4 milliGRAM(s) IV Push every 6 hours PRN Nausea and/or Vomiting        LABS                                            8.6                   Neurophils% (auto):   x      (10-24 @ 04:09):    13.9 )-----------(243          Lymphocytes% (auto):  x                                             25.0                   Eosinphils% (auto):   x        Manual%: Neutrophils x    ; Lymphocytes x    ; Eosinophils x    ; Bands%: x    ; Blasts x                                    136    |  101    |  2                   Calcium: 7.8   / iCa: x      (10-24 @ 15:39)    ----------------------------<  215       Magnesium: 2.0                              4.0     |  23     |  0.43             Phosphorous: 1.9      TPro  5.7    /  Alb  2.3    /  TBili  0.2    /  DBili  x      /  AST  39     /  ALT  38     /  AlkPhos  173    24 Oct 2017 04:09 MICU Transfer Note    Transfer from: MICU  Transfer to:  ( x ) Medicine    (  ) Telemetry    (  ) RCU    (  ) Palliative    (  ) Stroke Unit    (  ) _______________  Accepting physican:      MICU COURSE:  43 y/o F with DMT2 p/ to ED with SOB , chest pressure found to be in DKA in setting of noncompliance with insulin regimen. She was started on an insuline drip / DKA protocol and admitted to the MICU on 10/21/2017. Evaluation for infectious process and ACS were also performed. Troponins were trended and were negative. Cultures were sent. A CT abdomen pelvis was also performed with evidence of possible On 10/22 was Continue to titrate down on insulin gtt. Change IVF to D51/2 NS w/ 20 meq of K. Today AG closed, to be bridged to Lantus 30 with premeal 10 and resume diet. With GNR bacteremia, pending CT A/P overnight for source.   O/N: Off insulin drip. Potassium 3.0, given IV and PO potassium repletion. Opening anion gap required restart of insulin drip initially at 2. ABG at 5:32 with gluc 142, ionized rhea 1.07 and potassium 3.1. Given calcium gluconate, potassium chloride. Insulin drip set to 3. D5W 1/2 NS at 100 ml/hr. drip at 2 ml/hr  10/23: c/w [email or web address] rest for today. continue inuslin gtt. pending official read of CT. trialo   10/24: CT showed ? R pyelo. AG 15 to 19 overnight, restarted insulin gtt.  Positive blood culture for E. coli, afebrile. Will consult endo . E coli pan sensitive, deescalate to ceftriaxone. Will give Lantus 21un and humalog 5un TID. Lantus given at 4PM. Gap decreased to 12.Gtt off at 6PM        ASSESSMENT & PLAN:         For Follow-Up:          Vital Signs Last 24 Hrs  T(C): 37.1 (24 Oct 2017 19:18), Max: 37.7 (24 Oct 2017 04:00)  T(F): 98.7 (24 Oct 2017 19:18), Max: 99.8 (24 Oct 2017 04:00)  HR: 100 (24 Oct 2017 21:00) (98 - 122)  BP: 130/77 (24 Oct 2017 21:00) (104/56 - 146/81)  BP(mean): 97 (24 Oct 2017 21:00) (75 - 119)  RR: 18 (24 Oct 2017 21:00) (18 - 36)  SpO2: 100% (24 Oct 2017 21:00) (98% - 100%)  I&O's Summary    23 Oct 2017 07:01  -  24 Oct 2017 07:00  --------------------------------------------------------  IN: 3614 mL / OUT: 300 mL / NET: 3314 mL    24 Oct 2017 07:01  -  24 Oct 2017 21:57  --------------------------------------------------------  IN: 1858 mL / OUT: 1800 mL / NET: 58 mL          MEDICATIONS  (STANDING):  atorvastatin 40 milliGRAM(s) Oral at bedtime  cefTRIAXone   IVPB 2 Gram(s) IV Intermittent every 24 hours  docusate sodium 100 milliGRAM(s) Oral three times a day  heparin  Injectable 5000 Unit(s) SubCutaneous every 8 hours  influenza   Vaccine 0.5 milliLiter(s) IntraMuscular once  insulin glargine Injectable (LANTUS) 21 Unit(s) SubCutaneous <User Schedule>  insulin lispro (HumaLOG) corrective regimen sliding scale   SubCutaneous three times a day before meals  insulin lispro (HumaLOG) corrective regimen sliding scale   SubCutaneous at bedtime  insulin lispro Injectable (HumaLOG) 5 Unit(s) SubCutaneous three times a day before meals  polyethylene glycol 3350 17 Gram(s) Oral daily  senna 2 Tablet(s) Oral at bedtime  simethicone drops 80 milliGRAM(s) Oral every 4 hours    MEDICATIONS  (PRN):  acetaminophen   Tablet 650 milliGRAM(s) Oral every 6 hours PRN For Temp greater than 38 C (100.4 F)  ondansetron Injectable 4 milliGRAM(s) IV Push every 6 hours PRN Nausea and/or Vomiting        LABS                                            8.6                   Neurophils% (auto):   x      (10-24 @ 04:09):    13.9 )-----------(243          Lymphocytes% (auto):  x                                             25.0                   Eosinphils% (auto):   x        Manual%: Neutrophils x    ; Lymphocytes x    ; Eosinophils x    ; Bands%: x    ; Blasts x                                    136    |  101    |  2                   Calcium: 7.8   / iCa: x      (10-24 @ 15:39)    ----------------------------<  215       Magnesium: 2.0                              4.0     |  23     |  0.43             Phosphorous: 1.9      TPro  5.7    /  Alb  2.3    /  TBili  0.2    /  DBili  x      /  AST  39     /  ALT  38     /  AlkPhos  173    24 Oct 2017 04:09 MICU Transfer Note    Transfer from: MICU  Transfer to:  ( x ) Medicine    (  ) Telemetry    (  ) RCU    (  ) Palliative    (  ) Stroke Unit    (  ) _______________  Accepting physican:      MICU COURSE:  41 y/o F with DMT2 p/ to ED with SOB , chest pressure found to be in DKA in setting of noncompliance with insulin regimen. She was started on an insuline drip / DKA protocol and admitted to the MICU on 10/21/2017. Evaluation for infectious process and ACS were also performed. Troponins were trended and were negative. Cultures were sent. A CT abdomen pelvis was also performed with evidence of possible pyelonephritis. On 10/22 her insulin gtt was titrated down. Her AG closed, to be bridged to Lantus 30 with premeal 10 and resume diet. With GNR bacteremia, pending CT A/P overnight for source.   O/N: Off insulin drip. Potassium 3.0, given IV and PO potassium repletion. Opening anion gap required restart of insulin drip initially at 2. ABG at 5:32 with gluc 142, ionized rhea 1.07 and potassium 3.1. Given calcium gluconate, potassium chloride. Insulin drip set to 3. D5W 1/2 NS at 100 ml/hr. drip at 2 ml/hr  10/23: c/w zosyn.bowel rest for today. continue inuslin gtt. pending official read of CT. trialo   10/24: CT showed ? R pyelo. AG 15 to 19 overnight, restarted insulin gtt.  Positive blood culture for E. coli, afebrile. Will consult endo . E coli pan sensitive, deescalate to ceftriaxone. Will give Lantus 21un and humalog 5un TID. Lantus given at 4PM. Gap decreased to 12.Gtt off at 6PM        ASSESSMENT & PLAN:         For Follow-Up:          Vital Signs Last 24 Hrs  T(C): 37.1 (24 Oct 2017 19:18), Max: 37.7 (24 Oct 2017 04:00)  T(F): 98.7 (24 Oct 2017 19:18), Max: 99.8 (24 Oct 2017 04:00)  HR: 100 (24 Oct 2017 21:00) (98 - 122)  BP: 130/77 (24 Oct 2017 21:00) (104/56 - 146/81)  BP(mean): 97 (24 Oct 2017 21:00) (75 - 119)  RR: 18 (24 Oct 2017 21:00) (18 - 36)  SpO2: 100% (24 Oct 2017 21:00) (98% - 100%)  I&O's Summary    23 Oct 2017 07:01  -  24 Oct 2017 07:00  --------------------------------------------------------  IN: 3614 mL / OUT: 300 mL / NET: 3314 mL    24 Oct 2017 07:01  -  24 Oct 2017 21:57  --------------------------------------------------------  IN: 1858 mL / OUT: 1800 mL / NET: 58 mL          MEDICATIONS  (STANDING):  atorvastatin 40 milliGRAM(s) Oral at bedtime  cefTRIAXone   IVPB 2 Gram(s) IV Intermittent every 24 hours  docusate sodium 100 milliGRAM(s) Oral three times a day  heparin  Injectable 5000 Unit(s) SubCutaneous every 8 hours  influenza   Vaccine 0.5 milliLiter(s) IntraMuscular once  insulin glargine Injectable (LANTUS) 21 Unit(s) SubCutaneous <User Schedule>  insulin lispro (HumaLOG) corrective regimen sliding scale   SubCutaneous three times a day before meals  insulin lispro (HumaLOG) corrective regimen sliding scale   SubCutaneous at bedtime  insulin lispro Injectable (HumaLOG) 5 Unit(s) SubCutaneous three times a day before meals  polyethylene glycol 3350 17 Gram(s) Oral daily  senna 2 Tablet(s) Oral at bedtime  simethicone drops 80 milliGRAM(s) Oral every 4 hours    MEDICATIONS  (PRN):  acetaminophen   Tablet 650 milliGRAM(s) Oral every 6 hours PRN For Temp greater than 38 C (100.4 F)  ondansetron Injectable 4 milliGRAM(s) IV Push every 6 hours PRN Nausea and/or Vomiting        LABS                                            8.6                   Neurophils% (auto):   x      (10-24 @ 04:09):    13.9 )-----------(243          Lymphocytes% (auto):  x                                             25.0                   Eosinphils% (auto):   x        Manual%: Neutrophils x    ; Lymphocytes x    ; Eosinophils x    ; Bands%: x    ; Blasts x                                    136    |  101    |  2                   Calcium: 7.8   / iCa: x      (10-24 @ 15:39)    ----------------------------<  215       Magnesium: 2.0                              4.0     |  23     |  0.43             Phosphorous: 1.9      TPro  5.7    /  Alb  2.3    /  TBili  0.2    /  DBili  x      /  AST  39     /  ALT  38     /  AlkPhos  173    24 Oct 2017 04:09 MICU Transfer Note    Transfer from: MICU  Transfer to:  ( x ) Medicine    (  ) Telemetry    (  ) RCU    (  ) Palliative    (  ) Stroke Unit    (  ) _______________  Accepting physican:      MICU COURSE:  41 y/o F with DMT2 p/ to ED with SOB , chest pressure found to be in DKA in setting of noncompliance with insulin regimen. She was started on an insuline drip / DKA protocol and admitted to the MICU on 10/21/2017. Evaluation for infectious process and ACS were also performed. Troponins were trended and were negative. Cultures were sent. She was started on zosyn. A CT abdomen pelvis was also performed with evidence of possible pyelonephritis. On 10/22 her insulin gtt was titrated down. Her AG closed and she was bridged to Lantus 30 with premeal 10 and resume diet. Culture results demonstrated GNR bacteremia. Overnight her anion gap re-opened requiring her insulin drip to be restarted. She was tried off her drip again on the 23rd but on the 10/24, AG 15 to 19 overnight and insulin gtt restarted again. Positive blood culture for E. coli, afebrile. E coli pan sensitive, deescalate to ceftriaxone.  Endocrinology consulted. Lantus 21 U and humalog 5 U TID started. Gap decreased to 12. Gtt stopped at 6PM. Patient is stable for transfer to floor.    Patient will need continued antibiotic coverage for E. Coli bacteremia, now on total Abx day 3. Continue to monitor blood glucose and anion gap.      ASSESSMENT & PLAN:   41 y/o F with DMT2 p/ to ED with SOB , chest pressure found to be in DKA in setting of noncompliance with insulin regimen. Will also r/o other causes such as infectious process, ACLS.     #Neuro  - AAox3 no focal deficits.  metabolic encephalopathy 2/2 DKA and sepsis  - expected to resolve with treatment of infection and DKA     #Pulm  - Mild tachypnea likely in part due to compensation for metabolic acidosis  - Monitor pH and pCO2, check ABG?    #Cards  - HLD in the setting of DM2.   - Pt will benefit from statin therapy in d/c  - Tachycardia likely from volume depletion in the setting of DKA. c/w IVf.     #Renal  - Creatinine stable  - c/w IVF  - Avoid nephrotoxins.     #endo  - DKA: AG now at 12  - Transitioned to Lantus and humalog    #ID  - Sepsis 2/2 E. coli bacteremia 2/2 pyleo?  - Pt may need TTE if persistent cx and fevers.  - c/w ceftriaxone    #Heme  - Hb trending down likely 2/2 fluids  - If continues to trend down will need anemia workup    #GI  - NPO till AG closes.     #DVT ppx: HSQ        For Follow-Up:  Repeat blood cultures        Vital Signs Last 24 Hrs  T(C): 37.1 (24 Oct 2017 19:18), Max: 37.7 (24 Oct 2017 04:00)  T(F): 98.7 (24 Oct 2017 19:18), Max: 99.8 (24 Oct 2017 04:00)  HR: 100 (24 Oct 2017 21:00) (98 - 122)  BP: 130/77 (24 Oct 2017 21:00) (104/56 - 146/81)  BP(mean): 97 (24 Oct 2017 21:00) (75 - 119)  RR: 18 (24 Oct 2017 21:00) (18 - 36)  SpO2: 100% (24 Oct 2017 21:00) (98% - 100%)  I&O's Summary    23 Oct 2017 07:01  -  24 Oct 2017 07:00  --------------------------------------------------------  IN: 3614 mL / OUT: 300 mL / NET: 3314 mL    24 Oct 2017 07:01  -  24 Oct 2017 21:57  --------------------------------------------------------  IN: 1858 mL / OUT: 1800 mL / NET: 58 mL          MEDICATIONS  (STANDING):  atorvastatin 40 milliGRAM(s) Oral at bedtime  cefTRIAXone   IVPB 2 Gram(s) IV Intermittent every 24 hours  docusate sodium 100 milliGRAM(s) Oral three times a day  heparin  Injectable 5000 Unit(s) SubCutaneous every 8 hours  influenza   Vaccine 0.5 milliLiter(s) IntraMuscular once  insulin glargine Injectable (LANTUS) 21 Unit(s) SubCutaneous <User Schedule>  insulin lispro (HumaLOG) corrective regimen sliding scale   SubCutaneous three times a day before meals  insulin lispro (HumaLOG) corrective regimen sliding scale   SubCutaneous at bedtime  insulin lispro Injectable (HumaLOG) 5 Unit(s) SubCutaneous three times a day before meals  polyethylene glycol 3350 17 Gram(s) Oral daily  senna 2 Tablet(s) Oral at bedtime  simethicone drops 80 milliGRAM(s) Oral every 4 hours    MEDICATIONS  (PRN):  acetaminophen   Tablet 650 milliGRAM(s) Oral every 6 hours PRN For Temp greater than 38 C (100.4 F)  ondansetron Injectable 4 milliGRAM(s) IV Push every 6 hours PRN Nausea and/or Vomiting        LABS                                            8.6                   Neurophils% (auto):   x      (10-24 @ 04:09):    13.9 )-----------(243          Lymphocytes% (auto):  x                                             25.0                   Eosinphils% (auto):   x        Manual%: Neutrophils x    ; Lymphocytes x    ; Eosinophils x    ; Bands%: x    ; Blasts x                                    136    |  101    |  2                   Calcium: 7.8   / iCa: x      (10-24 @ 15:39)    ----------------------------<  215       Magnesium: 2.0                              4.0     |  23     |  0.43             Phosphorous: 1.9      TPro  5.7    /  Alb  2.3    /  TBili  0.2    /  DBili  x      /  AST  39     /  ALT  38     /  AlkPhos  173    24 Oct 2017 04:09 MICU Transfer Note    Transfer from: MICU  Transfer to:  ( x ) Medicine    (  ) Telemetry    (  ) RCU    (  ) Palliative    (  ) Stroke Unit    (  ) _______________  Accepting physican:  Bernardino Gandhi    MICU COURSE:  41 y/o F with DMT2 p/ to ED with SOB , chest pressure found to be in DKA in setting of noncompliance with insulin regimen. She was started on an insuline drip / DKA protocol and admitted to the MICU on 10/21/2017. Evaluation for infectious process and ACS were also performed. Troponins were trended and were negative. Cultures were sent. She was started on zosyn. A CT abdomen pelvis was also performed with evidence of possible pyelonephritis. On 10/22 her insulin gtt was titrated down. Her AG closed and she was bridged to Lantus 30 with premeal 10 and resume diet. Culture results demonstrated GNR bacteremia. Overnight her anion gap re-opened requiring her insulin drip to be restarted. She was tried off her drip again on the 23rd but on the 10/24, AG 15 to 19 overnight and insulin gtt restarted again. Positive blood culture for E. coli, afebrile. E coli pan sensitive, deescalate to ceftriaxone.  Endocrinology consulted. Lantus 21 U and humalog 5 U TID started. Gap decreased to 12. Gtt stopped at 6PM. Patient is stable for transfer to floor.    Patient will need continued antibiotic coverage for E. Coli bacteremia, now on total Abx day 3. Continue to monitor blood glucose and anion gap.      ASSESSMENT & PLAN:   41 y/o F with DMT2 p/ to ED with SOB , chest pressure found to be in DKA in setting of noncompliance with insulin regimen. Will also r/o other causes such as infectious process, ACLS.     #Neuro  - AAox3 no focal deficits.  metabolic encephalopathy 2/2 DKA and sepsis  - expected to resolve with treatment of infection and DKA     #Pulm  - Mild tachypnea likely in part due to compensation for metabolic acidosis  - Monitor pH and pCO2, check ABG?    #Cards  - HLD in the setting of DM2.   - Pt will benefit from statin therapy in d/c  - Tachycardia likely from volume depletion in the setting of DKA. c/w IVf.     #Renal  - Creatinine stable  - c/w IVF  - Avoid nephrotoxins.     #endo  - DKA: AG now at 12  - Transitioned to Lantus and humalog    #ID  - Sepsis 2/2 E. coli bacteremia 2/2 pyleo?  - Pt may need TTE if persistent cx and fevers.  - c/w ceftriaxone    #Heme  - Hb trending down likely 2/2 fluids  - If continues to trend down will need anemia workup    #GI  - NPO till AG closes.     #DVT ppx: HSQ        For Follow-Up:  Repeat blood cultures        Vital Signs Last 24 Hrs  T(C): 37.1 (24 Oct 2017 19:18), Max: 37.7 (24 Oct 2017 04:00)  T(F): 98.7 (24 Oct 2017 19:18), Max: 99.8 (24 Oct 2017 04:00)  HR: 100 (24 Oct 2017 21:00) (98 - 122)  BP: 130/77 (24 Oct 2017 21:00) (104/56 - 146/81)  BP(mean): 97 (24 Oct 2017 21:00) (75 - 119)  RR: 18 (24 Oct 2017 21:00) (18 - 36)  SpO2: 100% (24 Oct 2017 21:00) (98% - 100%)  I&O's Summary    23 Oct 2017 07:01  -  24 Oct 2017 07:00  --------------------------------------------------------  IN: 3614 mL / OUT: 300 mL / NET: 3314 mL    24 Oct 2017 07:01  -  24 Oct 2017 21:57  --------------------------------------------------------  IN: 1858 mL / OUT: 1800 mL / NET: 58 mL          MEDICATIONS  (STANDING):  atorvastatin 40 milliGRAM(s) Oral at bedtime  cefTRIAXone   IVPB 2 Gram(s) IV Intermittent every 24 hours  docusate sodium 100 milliGRAM(s) Oral three times a day  heparin  Injectable 5000 Unit(s) SubCutaneous every 8 hours  influenza   Vaccine 0.5 milliLiter(s) IntraMuscular once  insulin glargine Injectable (LANTUS) 21 Unit(s) SubCutaneous <User Schedule>  insulin lispro (HumaLOG) corrective regimen sliding scale   SubCutaneous three times a day before meals  insulin lispro (HumaLOG) corrective regimen sliding scale   SubCutaneous at bedtime  insulin lispro Injectable (HumaLOG) 5 Unit(s) SubCutaneous three times a day before meals  polyethylene glycol 3350 17 Gram(s) Oral daily  senna 2 Tablet(s) Oral at bedtime  simethicone drops 80 milliGRAM(s) Oral every 4 hours    MEDICATIONS  (PRN):  acetaminophen   Tablet 650 milliGRAM(s) Oral every 6 hours PRN For Temp greater than 38 C (100.4 F)  ondansetron Injectable 4 milliGRAM(s) IV Push every 6 hours PRN Nausea and/or Vomiting        LABS                                            8.6                   Neurophils% (auto):   x      (10-24 @ 04:09):    13.9 )-----------(243          Lymphocytes% (auto):  x                                             25.0                   Eosinphils% (auto):   x        Manual%: Neutrophils x    ; Lymphocytes x    ; Eosinophils x    ; Bands%: x    ; Blasts x                                    136    |  101    |  2                   Calcium: 7.8   / iCa: x      (10-24 @ 15:39)    ----------------------------<  215       Magnesium: 2.0                              4.0     |  23     |  0.43             Phosphorous: 1.9      TPro  5.7    /  Alb  2.3    /  TBili  0.2    /  DBili  x      /  AST  39     /  ALT  38     /  AlkPhos  173    24 Oct 2017 04:09

## 2017-10-24 NOTE — CONSULT NOTE ADULT - PROBLEM SELECTOR RECOMMENDATION 9
-transition off with Lantus 21 units sq qhs,  -patient with closed AG so can give clears at dinner  -appreciate RD education  -needs review of pen technique -transition off with Lantus 21 units sq qhs and add humalog 5 units tid-ac. Will likely need to decrease Lantus dose tomorrow.  -patient with closed AG so can give diabetes clears at dinner and advance as tolerated  -appreciate RD education  -needs review of pen technique

## 2017-10-25 LAB
ALBUMIN SERPL ELPH-MCNC: 2.2 G/DL — LOW (ref 3.3–5)
ALP SERPL-CCNC: 173 U/L — HIGH (ref 40–120)
ALT FLD-CCNC: 30 U/L RC — SIGNIFICANT CHANGE UP (ref 10–45)
ANION GAP SERPL CALC-SCNC: 11 MMOL/L — SIGNIFICANT CHANGE UP (ref 5–17)
AST SERPL-CCNC: 25 U/L — SIGNIFICANT CHANGE UP (ref 10–40)
BASOPHILS # BLD AUTO: 0 K/UL — SIGNIFICANT CHANGE UP (ref 0–0.2)
BILIRUB SERPL-MCNC: 0.3 MG/DL — SIGNIFICANT CHANGE UP (ref 0.2–1.2)
BUN SERPL-MCNC: 2 MG/DL — LOW (ref 7–23)
CALCIUM SERPL-MCNC: 7.9 MG/DL — LOW (ref 8.4–10.5)
CHLORIDE SERPL-SCNC: 103 MMOL/L — SIGNIFICANT CHANGE UP (ref 96–108)
CO2 SERPL-SCNC: 28 MMOL/L — SIGNIFICANT CHANGE UP (ref 22–31)
CREAT SERPL-MCNC: 0.44 MG/DL — LOW (ref 0.5–1.3)
CULTURE RESULTS: SIGNIFICANT CHANGE UP
EOSINOPHIL # BLD AUTO: 0.1 K/UL — SIGNIFICANT CHANGE UP (ref 0–0.5)
EOSINOPHIL NFR BLD AUTO: 1 % — SIGNIFICANT CHANGE UP (ref 0–6)
FERRITIN SERPL-MCNC: 336 NG/ML — HIGH (ref 15–150)
GLUCOSE BLDC GLUCOMTR-MCNC: 102 MG/DL — HIGH (ref 70–99)
GLUCOSE BLDC GLUCOMTR-MCNC: 105 MG/DL — HIGH (ref 70–99)
GLUCOSE BLDC GLUCOMTR-MCNC: 128 MG/DL — HIGH (ref 70–99)
GLUCOSE BLDC GLUCOMTR-MCNC: 133 MG/DL — HIGH (ref 70–99)
GLUCOSE BLDC GLUCOMTR-MCNC: 162 MG/DL — HIGH (ref 70–99)
GLUCOSE BLDC GLUCOMTR-MCNC: 230 MG/DL — HIGH (ref 70–99)
GLUCOSE BLDC GLUCOMTR-MCNC: 259 MG/DL — HIGH (ref 70–99)
GLUCOSE BLDC GLUCOMTR-MCNC: 91 MG/DL — SIGNIFICANT CHANGE UP (ref 70–99)
GLUCOSE SERPL-MCNC: 99 MG/DL — SIGNIFICANT CHANGE UP (ref 70–99)
HCT VFR BLD CALC: 21.1 % — LOW (ref 34.5–45)
HCT VFR BLD CALC: 26.6 % — LOW (ref 34.5–45)
HGB BLD-MCNC: 7.2 G/DL — LOW (ref 11.5–15.5)
HGB BLD-MCNC: 9 G/DL — LOW (ref 11.5–15.5)
IRON SATN MFR SERPL: 12 % — LOW (ref 14–50)
IRON SATN MFR SERPL: 15 UG/DL — LOW (ref 30–160)
LG PLATELETS BLD QL AUTO: SLIGHT — SIGNIFICANT CHANGE UP
LYMPHOCYTES # BLD AUTO: 17 % — SIGNIFICANT CHANGE UP (ref 13–44)
LYMPHOCYTES # BLD AUTO: 2.4 K/UL — SIGNIFICANT CHANGE UP (ref 1–3.3)
MAGNESIUM SERPL-MCNC: 2 MG/DL — SIGNIFICANT CHANGE UP (ref 1.6–2.6)
MCHC RBC-ENTMCNC: 28.7 PG — SIGNIFICANT CHANGE UP (ref 27–34)
MCHC RBC-ENTMCNC: 28.7 PG — SIGNIFICANT CHANGE UP (ref 27–34)
MCHC RBC-ENTMCNC: 33.8 GM/DL — SIGNIFICANT CHANGE UP (ref 32–36)
MCHC RBC-ENTMCNC: 34 GM/DL — SIGNIFICANT CHANGE UP (ref 32–36)
MCV RBC AUTO: 84.3 FL — SIGNIFICANT CHANGE UP (ref 80–100)
MCV RBC AUTO: 85 FL — SIGNIFICANT CHANGE UP (ref 80–100)
MONOCYTES # BLD AUTO: 1.5 K/UL — HIGH (ref 0–0.9)
MONOCYTES NFR BLD AUTO: 10 % — SIGNIFICANT CHANGE UP (ref 2–14)
NEUTROPHILS # BLD AUTO: 10.1 K/UL — HIGH (ref 1.8–7.4)
NEUTROPHILS NFR BLD AUTO: 72 % — SIGNIFICANT CHANGE UP (ref 43–77)
PHOSPHATE SERPL-MCNC: 3.3 MG/DL — SIGNIFICANT CHANGE UP (ref 2.5–4.5)
PLAT MORPH BLD: NORMAL — SIGNIFICANT CHANGE UP
PLATELET # BLD AUTO: 273 K/UL — SIGNIFICANT CHANGE UP (ref 150–400)
PLATELET # BLD AUTO: 287 K/UL — SIGNIFICANT CHANGE UP (ref 150–400)
POTASSIUM SERPL-MCNC: 3.4 MMOL/L — LOW (ref 3.5–5.3)
POTASSIUM SERPL-SCNC: 3.4 MMOL/L — LOW (ref 3.5–5.3)
PROT SERPL-MCNC: 5.8 G/DL — LOW (ref 6–8.3)
RBC # BLD: 2.5 M/UL — LOW (ref 3.8–5.2)
RBC # BLD: 3.13 M/UL — LOW (ref 3.8–5.2)
RBC # FLD: 11.9 % — SIGNIFICANT CHANGE UP (ref 10.3–14.5)
RBC # FLD: 11.9 % — SIGNIFICANT CHANGE UP (ref 10.3–14.5)
RBC BLD AUTO: SIGNIFICANT CHANGE UP
SODIUM SERPL-SCNC: 142 MMOL/L — SIGNIFICANT CHANGE UP (ref 135–145)
SPECIMEN SOURCE: SIGNIFICANT CHANGE UP
TIBC SERPL-MCNC: 123 UG/DL — LOW (ref 220–430)
UIBC SERPL-MCNC: 108 UG/DL — LOW (ref 110–370)
WBC # BLD: 12.2 K/UL — HIGH (ref 3.8–10.5)
WBC # BLD: 14.1 K/UL — HIGH (ref 3.8–10.5)
WBC # FLD AUTO: 12.2 K/UL — HIGH (ref 3.8–10.5)
WBC # FLD AUTO: 14.1 K/UL — HIGH (ref 3.8–10.5)

## 2017-10-25 PROCEDURE — 99233 SBSQ HOSP IP/OBS HIGH 50: CPT | Mod: GC

## 2017-10-25 PROCEDURE — 99232 SBSQ HOSP IP/OBS MODERATE 35: CPT

## 2017-10-25 RX ORDER — INSULIN LISPRO 100/ML
7 VIAL (ML) SUBCUTANEOUS
Qty: 0 | Refills: 0 | Status: DISCONTINUED | OUTPATIENT
Start: 2017-10-25 | End: 2017-10-27

## 2017-10-25 RX ORDER — SODIUM CHLORIDE 9 MG/ML
1000 INJECTION, SOLUTION INTRAVENOUS
Qty: 0 | Refills: 0 | Status: DISCONTINUED | OUTPATIENT
Start: 2017-10-25 | End: 2017-10-27

## 2017-10-25 RX ORDER — DEXTROSE 50 % IN WATER 50 %
25 SYRINGE (ML) INTRAVENOUS ONCE
Qty: 0 | Refills: 0 | Status: DISCONTINUED | OUTPATIENT
Start: 2017-10-25 | End: 2017-10-27

## 2017-10-25 RX ORDER — DEXTROSE 50 % IN WATER 50 %
12.5 SYRINGE (ML) INTRAVENOUS ONCE
Qty: 0 | Refills: 0 | Status: DISCONTINUED | OUTPATIENT
Start: 2017-10-25 | End: 2017-10-27

## 2017-10-25 RX ORDER — DEXTROSE 50 % IN WATER 50 %
1 SYRINGE (ML) INTRAVENOUS ONCE
Qty: 0 | Refills: 0 | Status: DISCONTINUED | OUTPATIENT
Start: 2017-10-25 | End: 2017-10-27

## 2017-10-25 RX ORDER — INSULIN LISPRO 100/ML
VIAL (ML) SUBCUTANEOUS
Qty: 0 | Refills: 0 | Status: DISCONTINUED | OUTPATIENT
Start: 2017-10-25 | End: 2017-10-27

## 2017-10-25 RX ORDER — GLUCAGON INJECTION, SOLUTION 0.5 MG/.1ML
1 INJECTION, SOLUTION SUBCUTANEOUS ONCE
Qty: 0 | Refills: 0 | Status: DISCONTINUED | OUTPATIENT
Start: 2017-10-25 | End: 2017-10-27

## 2017-10-25 RX ORDER — POTASSIUM CHLORIDE 20 MEQ
40 PACKET (EA) ORAL ONCE
Qty: 0 | Refills: 0 | Status: COMPLETED | OUTPATIENT
Start: 2017-10-25 | End: 2017-10-25

## 2017-10-25 RX ADMIN — HEPARIN SODIUM 5000 UNIT(S): 5000 INJECTION INTRAVENOUS; SUBCUTANEOUS at 06:22

## 2017-10-25 RX ADMIN — Medication 100 MILLIGRAM(S): at 06:21

## 2017-10-25 RX ADMIN — Medication 40 MILLIEQUIVALENT(S): at 06:22

## 2017-10-25 RX ADMIN — HEPARIN SODIUM 5000 UNIT(S): 5000 INJECTION INTRAVENOUS; SUBCUTANEOUS at 14:30

## 2017-10-25 RX ADMIN — SENNA PLUS 2 TABLET(S): 8.6 TABLET ORAL at 21:41

## 2017-10-25 RX ADMIN — Medication: at 18:50

## 2017-10-25 RX ADMIN — INSULIN GLARGINE 21 UNIT(S): 100 INJECTION, SOLUTION SUBCUTANEOUS at 17:12

## 2017-10-25 RX ADMIN — CEFTRIAXONE 100 GRAM(S): 500 INJECTION, POWDER, FOR SOLUTION INTRAMUSCULAR; INTRAVENOUS at 14:20

## 2017-10-25 RX ADMIN — Medication 100 MILLIGRAM(S): at 21:41

## 2017-10-25 RX ADMIN — ATORVASTATIN CALCIUM 40 MILLIGRAM(S): 80 TABLET, FILM COATED ORAL at 21:41

## 2017-10-25 RX ADMIN — HEPARIN SODIUM 5000 UNIT(S): 5000 INJECTION INTRAVENOUS; SUBCUTANEOUS at 21:41

## 2017-10-25 RX ADMIN — SIMETHICONE 80 MILLIGRAM(S): 80 TABLET, CHEWABLE ORAL at 21:41

## 2017-10-25 RX ADMIN — Medication 5 UNIT(S): at 10:04

## 2017-10-25 RX ADMIN — Medication 5 UNIT(S): at 14:05

## 2017-10-25 RX ADMIN — SIMETHICONE 80 MILLIGRAM(S): 80 TABLET, CHEWABLE ORAL at 06:22

## 2017-10-25 RX ADMIN — Medication 1: at 14:06

## 2017-10-25 RX ADMIN — SIMETHICONE 80 MILLIGRAM(S): 80 TABLET, CHEWABLE ORAL at 14:25

## 2017-10-25 RX ADMIN — Medication 7 UNIT(S): at 17:15

## 2017-10-25 NOTE — CHART NOTE - NSCHARTNOTEFT_GEN_A_CORE
41 y/o female with h/o T2DM presented to ED after not taking insulin while on vacation, found to be in DKA. Patient admitted to MICU, pH=6.90, AG=34, patient maintained on insulin gtt, endocrine consulted, anion gap closed and patient transitioned to PO feeds with lantus 4pm qd and humalog TID AC. Patient also septic during MICU stay, BCx positive for pan-sensitive E. coli and CTAP showed evidence of pyelonephritis despite negative UA and UCx. Patient currently doing well, completing course of ceftriaxone for E. coli bacteremia. Endocrine continuing to titrate insulin regimen, will continue to f/u recs for while in-hospital and dispo planning as well. Patient stable for transfer to floor for ongoing management.     Dinora Parekh MD  [facility] [id number]

## 2017-10-25 NOTE — PROGRESS NOTE ADULT - ASSESSMENT
41 y/o F with DMT2 p/ to ED with SOB , chest pressure found to be in DKA in setting of noncompliance with insulin regimen. Will also r/o other causes such as infectious process, ACLS.     #Neuro  - AAox3 no focal deficits.  metabolic encephalopathy 2/2 DKA and sepsis - resolved     #Pulm  - Pt was tachypneic from acidosis on arrival, but much improved now.   - pH improved and PCO2 stable.     #cards  - HLD in the setting of DM2.   - statin started.     #Renal  - Prerenal azotemia in the setting of dehdration 2/2 DKA - resolved.     #endo  - DKA:   - Gap 16 today after corrected for albumin.   - endo c/s placed. c/w PO feeds and sub q insulin 21 units at night and 5 units pre meal.   - will be advancing diet today.     #ID  - Sepsis 2/2 e. coli bacteremia.   - source pyelo? negative urine and urine cx.   - pt may need TTE if persistent cx and fevers.  - downgraded to ceftriaxone.     #Heme  - hgb trending down maybe 2/2 fluids  - if continues to trend down will check iron studies and FOBT    #GI  - pt tolerating clears.     #DVT ppx: HSQ 41 y/o F with DMT2 p/ to ED with SOB , chest pressure found to be in DKA in setting of noncompliance with insulin regimen. Will also r/o other causes such as infectious process, ACLS.     #Neuro  - AAox3 no focal deficits.  metabolic encephalopathy 2/2 DKA and sepsis - resolved     #Pulm  - Pt was tachypneic from acidosis on arrival, but much improved now.   - pH improved and PCO2 stable.     #cards  - HLD in the setting of DM2.   - statin started.     #Renal  - Prerenal azotemia in the setting of dehdration 2/2 DKA - resolved.     #endo  - DKA:   - Gap 16 today after corrected for albumin.   - endo c/s placed. c/w PO feeds and sub q insulin 21 units at night and 7 units pre meal.   - will be advancing diet today.     #ID  - Sepsis 2/2 e. coli bacteremia.   - source pyelo? negative urine and urine cx.   - pt may need TTE if persistent cx and fevers.  - downgraded to ceftriaxone.     #Heme  - hgb trending down maybe 2/2 fluids  - if continues to trend down will check iron studies and FOBT    #GI  - pt tolerating clears.     #DVT ppx: HSQ

## 2017-10-25 NOTE — PROGRESS NOTE ADULT - PROBLEM SELECTOR PLAN 1
-Test BG ac and hs   -Continue Lantus dose of 21 units at 6pm  -Increase Humalog to 7 units ac meals  -Continue Humalog correction scales ac meals  -Add low dose correction scale at hs  -Pt states she followed up long time ago at [facility] [place] but would like to continue following at [place]. Will make apt prior discharge.  -Plan discussed with pt/team.  Contact info: [phone] (24/7). pager [phone]

## 2017-10-25 NOTE — PROGRESS NOTE ADULT - ASSESSMENT
42 y/o F w/h/o uncontrolled T2DM non adherent. Here with SOB/Chest discomfort found to be in DKA and sepsis with E-coli bacteremia. On antibiotic. Remains in MICU awaiting transfer to regular unit. DKA resolved and tolerating POs advanced to consistent carb diet with prandial hyperglycemia. Will increase meal time insulin.  Spoke to pt in depth about importance of insulin adherence to prevent infection and DM complications. Pt states she now knows this and  "learned my lesson" about managing her DM.

## 2017-10-25 NOTE — PROGRESS NOTE ADULT - SUBJECTIVE AND OBJECTIVE BOX
CHIEF COMPLAINT: Patient is a 42y old  Female who presents with a chief complaint of SOB , chest pressure and fatigue (21 Oct 2017 19:51)    Interval Events: Pt had no acute events overnight.  Pt doing well ambulating on her own. Transitioned off insulin gtt. now on sub q insulin.     Review of Systems:   CONSTITUTIONAL: No fever, weight loss, or fatigue  EYES: No eye pain, visual disturbances, or discharge  ENMT:  No difficulty hearing, tinnitus, vertigo; No sinus or throat pain  NECK: No pain or stiffness  RESPIRATORY: No cough, wheezing, chills or hemoptysis; No shortness of breath  CARDIOVASCULAR: No chest pain, palpitations, dizziness, or leg swelling  GASTROINTESTINAL: No abdominal or epigastric pain. No nausea, vomiting, or hematemesis; No diarrhea or constipation. No melena or hematochezia.  GENITOURINARY: No dysuria, frequency, hematuria, or incontinence  NEUROLOGICAL: No headaches, memory loss, loss of strength, numbness, or tremors  SKIN: No itching, burning, rashes, or lesions   MUSCULOSKELETAL: No joint pain or swelling; No muscle, back, or extremity pain      OBJECTIVE:  ICU Vital Signs Last 24 Hrs  T(C): 37.2 (25 Oct 2017 04:00), Max: 37.4 (25 Oct 2017 00:00)  T(F): 99 (25 Oct 2017 04:00), Max: 99.3 (25 Oct 2017 00:00)  HR: 92 (25 Oct 2017 07:00) (92 - 118)  BP: 122/61 (25 Oct 2017 07:00) (98/55 - 146/81)  BP(mean): 84 (25 Oct 2017 07:00) (70 - 107)  ABP: --  ABP(mean): --  RR: 20 (25 Oct 2017 07:00) (17 - 29)  SpO2: 99% (25 Oct 2017 07:00) (97% - 100%)        10-24 @ 07:01  -  10-25 @ 07:00  --------------------------------------------------------  IN: 2608 mL / OUT: 1800 mL / NET: 808 mL      CAPILLARY BLOOD GLUCOSE  91 (25 Oct 2017 06:00)      POCT Blood Glucose.: 91 mg/dL (25 Oct 2017 06:23)      PHYSICAL EXAM:  GENERAL: NAD, well-developed  HEAD:  Atraumatic, Normocephalic  EYES: EOMI, PERRLA, conjunctiva and sclera clear  NECK: Supple, No JVD  CHEST/LUNG: Clear to auscultation bilaterally; No wheeze  HEART: Regular rate and rhythm; No murmurs, rubs, or gallops  ABDOMEN: Soft, Nontender, Nondistended; Bowel sounds present  EXTREMITIES:  2+ Peripheral Pulses, No clubbing, cyanosis, or edema  PSYCH: AAOx3  NEUROLOGY: non-focal  SKIN: No rashes or lesions    HOSPITAL MEDICATIONS:  MEDICATIONS  (STANDING):  atorvastatin 40 milliGRAM(s) Oral at bedtime  cefTRIAXone   IVPB 2 Gram(s) IV Intermittent every 24 hours  docusate sodium 100 milliGRAM(s) Oral three times a day  heparin  Injectable 5000 Unit(s) SubCutaneous every 8 hours  influenza   Vaccine 0.5 milliLiter(s) IntraMuscular once  insulin glargine Injectable (LANTUS) 21 Unit(s) SubCutaneous <User Schedule>  insulin lispro (HumaLOG) corrective regimen sliding scale   SubCutaneous three times a day before meals  insulin lispro (HumaLOG) corrective regimen sliding scale   SubCutaneous at bedtime  insulin lispro Injectable (HumaLOG) 5 Unit(s) SubCutaneous three times a day before meals  polyethylene glycol 3350 17 Gram(s) Oral daily  senna 2 Tablet(s) Oral at bedtime  simethicone drops 80 milliGRAM(s) Oral every 4 hours    MEDICATIONS  (PRN):  acetaminophen   Tablet 650 milliGRAM(s) Oral every 6 hours PRN For Temp greater than 38 C (100.4 F)  ondansetron Injectable 4 milliGRAM(s) IV Push every 6 hours PRN Nausea and/or Vomiting      LABS:  (10-25 @ 04:33)                        7.2  14.1 )-----------( 287                 21.1    Neutrophils = 10.1 (72.0%)  Lymphocytes = 2.4 (17.0%)  Eosinophils = 0.1 (1.0%)  Basophils = 0.0 (--%)  Monocytes = 1.5 (10.0%)  Bands = --%    WBC Trend: 14.1<--, 13.9<--, 17.5<--  Hb Trend: 7.2<--, 8.6<--, 9.4<--, 11.5<--, 11.9<--  Plt Trend: 287<--, 243<--, 256<--, 274<--, 347<--  10-25    142  |  103  |  2<L>  ----------------------------<  99  3.4<L>   |  28  |  0.44<L>    Ca    7.9<L>      25 Oct 2017 04:33  Phos  3.3     10-25  Mg     2.0     10-25    TPro  5.8<L>  /  Alb  2.2<L>  /  TBili  0.3  /  DBili  x   /  AST  25  /  ALT  30  /  AlkPhos  173<H>  10-25    Creatinine Trend: 0.44<--, 0.43<--, 0.48<--, 0.58<--, 0.64<--, 0.57<--        Venous Blood Gas:  10-24 @ 04:07  7.46/26/131/18/100  VBG Lactate: 3.8  Venous Blood Gas:  10-23 @ 15:16  7.44/25/105/17/99  VBG Lactate: 1.9  Venous Blood Gas:  10-23 @ 08:23  7.34/32/27/17/53  VBG Lactate: 2.3      Blood cultures + E. coli

## 2017-10-25 NOTE — PROGRESS NOTE ADULT - SUBJECTIVE AND OBJECTIVE BOX
DIABETES FOLLOW UP NOTE:  INTERVAL HX: 40 y/o F w/h/o uncontrolled T2DM non adherent. Here with SOB/Chest discomfort found to be in DKA and sepsis with E-coli bacteremia. On antibiotic. Remains in MICU awaiting transfer to regular unit. DKA resolved and pt reports no symptoms, feeling better and tolerating POs of clear liquid diet. Diet now advanced to consistent carb diet.  Glycemic control  variable with BG going up this pm as PO intake improves.    Review of Systems:  Cardiovascular: No chest pain, palpitations  Respiratory: No SOB, no cough  GI: No nausea, vomiting, abdominal pain  Endocrine: no polyuria, polydipsia or S&Sx of hypoglycemia    Allergies    No Known Allergies    Intolerances      MEDICATION:  atorvastatin 40 milliGRAM(s) Oral at bedtime  cefTRIAXone   IVPB 2 Gram(s) IV Intermittent every 24 hours  insulin glargine Injectable (LANTUS) 21 Unit(s) SubCutaneous <User Schedule>  insulin lispro (HumaLOG) corrective regimen sliding scale   SubCutaneous Before meals and at bedtime  insulin lispro Injectable (HumaLOG) 5 Unit(s) SubCutaneous three times a day before meals      PHYSICAL EXAM:  VITALS: T(C): 37.2 (10-25-17 @ 16:00)  T(F): 99 (10-25-17 @ 16:00), Max: 99.3 (10-25-17 @ 00:00)  HR: 96 (10-25-17 @ 17:00) (88 - 108)  BP: 136/72 (10-25-17 @ 17:00) (98/55 - 146/81)  RR:  (16 - 29)  SpO2:  (97% - 100%)  Wt(kg): --  GENERAL: NAD  Abdomen: Soft, nontender, non distended.  Extremities: Warm, no edema in LEs noted  NEURO: A&Ox3    LABS:  POCT Blood Glucose.: 259 mg/dL (10-25-17 @ 17:08)  POCT Blood Glucose.: 230 mg/dL (10-25-17 @ 14:01)  POCT Blood Glucose.: 162 mg/dL (10-25-17 @ 09:59)  POCT Blood Glucose.: 91 mg/dL (10-25-17 @ 06:23)  POCT Blood Glucose.: 102 mg/dL (10-25-17 @ 04:06)  POCT Blood Glucose.: 105 mg/dL (10-25-17 @ 02:09)  POCT Blood Glucose.: 187 mg/dL (10-24-17 @ 22:52)  POCT Blood Glucose.: 168 mg/dL (10-24-17 @ 19:36)  POCT Blood Glucose.: 204 mg/dL (10-24-17 @ 17:14)  POCT Blood Glucose.: 183 mg/dL (10-24-17 @ 16:24)  POCT Blood Glucose.: 197 mg/dL (10-24-17 @ 15:01)  POCT Blood Glucose.: 202 mg/dL (10-24-17 @ 14:15)  POCT Blood Glucose.: 191 mg/dL (10-24-17 @ 13:08)  POCT Blood Glucose.: 177 mg/dL (10-24-17 @ 12:01)  POCT Blood Glucose.: 173 mg/dL (10-24-17 @ 11:01)  POCT Blood Glucose.: 157 mg/dL (10-24-17 @ 10:04)  POCT Blood Glucose.: 160 mg/dL (10-24-17 @ 09:04)  POCT Blood Glucose.: 101 mg/dL (10-24-17 @ 08:18)  POCT Blood Glucose.: 62 mg/dL (10-24-17 @ 07:52)  POCT Blood Glucose.: 62 mg/dL (10-24-17 @ 06:01)  POCT Blood Glucose.: 57 mg/dL (10-24-17 @ 06:00)  POCT Blood Glucose.: 166 mg/dL (10-24-17 @ 04:00)  POCT Blood Glucose.: 164 mg/dL (10-24-17 @ 03:05)  POCT Blood Glucose.: 90 mg/dL (10-24-17 @ 02:29)  POCT Blood Glucose.: 60 mg/dL (10-24-17 @ 02:04)  POCT Blood Glucose.: 128 mg/dL (10-24-17 @ 00:19)                          9.0    12.2  )-----------( 273      ( 25 Oct 2017 11:21 )             26.6       10-25    142  |  103  |  2<L>  ----------------------------<  99  3.4<L>   |  28  |  0.44<L>    EGFR if : 143  EGFR if non : 124    Ca    7.9<L>      10-25  Mg     2.0     10-25  Phos  3.3     10-25    TPro  5.8<L>  /  Alb  2.2<L>  /  TBili  0.3  /  DBili  x   /  AST  25  /  ALT  30  /  AlkPhos  173<H>  10-25      Hemoglobin A1C, Whole Blood: 14.9 % <H> [4.0 - 5.6] (10-22-17 @ 04:53)      10-22 Chol 178  HDL 16<L> Trig 216<H>

## 2017-10-26 DIAGNOSIS — K59.00 CONSTIPATION, UNSPECIFIED: ICD-10-CM

## 2017-10-26 DIAGNOSIS — R78.81 BACTEREMIA: ICD-10-CM

## 2017-10-26 DIAGNOSIS — Z29.9 ENCOUNTER FOR PROPHYLACTIC MEASURES, UNSPECIFIED: ICD-10-CM

## 2017-10-26 DIAGNOSIS — E11.9 TYPE 2 DIABETES MELLITUS WITHOUT COMPLICATIONS: ICD-10-CM

## 2017-10-26 LAB
GLUCOSE BLDC GLUCOMTR-MCNC: 122 MG/DL — HIGH (ref 70–99)
GLUCOSE BLDC GLUCOMTR-MCNC: 134 MG/DL — HIGH (ref 70–99)
GLUCOSE BLDC GLUCOMTR-MCNC: 138 MG/DL — HIGH (ref 70–99)
GLUCOSE BLDC GLUCOMTR-MCNC: 158 MG/DL — HIGH (ref 70–99)

## 2017-10-26 PROCEDURE — 99233 SBSQ HOSP IP/OBS HIGH 50: CPT | Mod: GC

## 2017-10-26 RX ADMIN — HEPARIN SODIUM 5000 UNIT(S): 5000 INJECTION INTRAVENOUS; SUBCUTANEOUS at 21:48

## 2017-10-26 RX ADMIN — POLYETHYLENE GLYCOL 3350 17 GRAM(S): 17 POWDER, FOR SOLUTION ORAL at 13:46

## 2017-10-26 RX ADMIN — Medication 100 MILLIGRAM(S): at 21:47

## 2017-10-26 RX ADMIN — SIMETHICONE 80 MILLIGRAM(S): 80 TABLET, CHEWABLE ORAL at 21:48

## 2017-10-26 RX ADMIN — Medication 7 UNIT(S): at 13:44

## 2017-10-26 RX ADMIN — HEPARIN SODIUM 5000 UNIT(S): 5000 INJECTION INTRAVENOUS; SUBCUTANEOUS at 05:24

## 2017-10-26 RX ADMIN — SIMETHICONE 80 MILLIGRAM(S): 80 TABLET, CHEWABLE ORAL at 09:07

## 2017-10-26 RX ADMIN — SIMETHICONE 80 MILLIGRAM(S): 80 TABLET, CHEWABLE ORAL at 05:24

## 2017-10-26 RX ADMIN — Medication 100 MILLIGRAM(S): at 13:45

## 2017-10-26 RX ADMIN — SENNA PLUS 2 TABLET(S): 8.6 TABLET ORAL at 21:47

## 2017-10-26 RX ADMIN — CEFTRIAXONE 100 GRAM(S): 500 INJECTION, POWDER, FOR SOLUTION INTRAMUSCULAR; INTRAVENOUS at 13:44

## 2017-10-26 RX ADMIN — Medication 2: at 09:12

## 2017-10-26 RX ADMIN — Medication 7 UNIT(S): at 09:12

## 2017-10-26 RX ADMIN — Medication 7 UNIT(S): at 18:24

## 2017-10-26 RX ADMIN — SIMETHICONE 80 MILLIGRAM(S): 80 TABLET, CHEWABLE ORAL at 18:24

## 2017-10-26 RX ADMIN — ATORVASTATIN CALCIUM 40 MILLIGRAM(S): 80 TABLET, FILM COATED ORAL at 21:47

## 2017-10-26 RX ADMIN — INSULIN GLARGINE 21 UNIT(S): 100 INJECTION, SOLUTION SUBCUTANEOUS at 18:45

## 2017-10-26 RX ADMIN — SIMETHICONE 80 MILLIGRAM(S): 80 TABLET, CHEWABLE ORAL at 13:45

## 2017-10-26 RX ADMIN — Medication 100 MILLIGRAM(S): at 05:24

## 2017-10-26 RX ADMIN — HEPARIN SODIUM 5000 UNIT(S): 5000 INJECTION INTRAVENOUS; SUBCUTANEOUS at 13:44

## 2017-10-26 NOTE — PROGRESS NOTE ADULT - ASSESSMENT
41 yo F with a PMH of IDDM and back abscess 7/2017 admitted for DKA now resolved. Hospital course c/b Ecoli bacteremia.

## 2017-10-26 NOTE — PROGRESS NOTE ADULT - SUBJECTIVE AND OBJECTIVE BOX
Patient is a 42y old  Female who presents with a chief complaint of SOB , chest pressure and fatigue (21 Oct 2017 19:51)    SUBJECTIVE / OVERNIGHT EVENTS:  Patient transferred from the MICU.     43 yo F with a PMH of IDDM and back abscess 7/2017 who presents with SOB, abdominal pain, constipation. Patient states that for the past several months has not consistently taking her insulin or checking her blood sugar. On 10/17/17 she travelled to [place] for family event where she was not taking her insulin. Patient states that she developed thirst, anorexia, diffuse abdominal pain, constipation, chills, SOB. Denies any fever, cough, chest pain, palpitations, dysuria, frequency, foul smelling urine. She came to the hospital from the airport on 10/21/17. She was admitted with DKA to the MICU. MICU course was complicated by sepsis 2/2 Ecoli bacteremia (initial BCx 10/21) from unknown source. UA and UCx were negative. Patient was treated with zosyn 3.375 IV q8 (10/22-10/24) and ceftriaxone 2g qd (10/24- ). Blood cultures cleared on 10/24/17.    Of note, patient states that she has had urinary tract infections in the past, but has not had one in several years.    PMH: IDDM  PSH: Csection, Laproscopic procedure for tuboovarian abcess.    Allg: NKDA  SH:   - lives at home with  and children   - denies any EtoH/smoking/illicit drug use.     MEDICATIONS  (STANDING):  atorvastatin 40 milliGRAM(s) Oral at bedtime  cefTRIAXone   IVPB 2 Gram(s) IV Intermittent every 24 hours  dextrose 5%. 1000 milliLiter(s) (50 mL/Hr) IV Continuous <Continuous>  dextrose 50% Injectable 12.5 Gram(s) IV Push once  dextrose 50% Injectable 25 Gram(s) IV Push once  dextrose 50% Injectable 25 Gram(s) IV Push once  docusate sodium 100 milliGRAM(s) Oral three times a day  heparin  Injectable 5000 Unit(s) SubCutaneous every 8 hours  influenza   Vaccine 0.5 milliLiter(s) IntraMuscular once  insulin glargine Injectable (LANTUS) 21 Unit(s) SubCutaneous <User Schedule>  insulin lispro (HumaLOG) corrective regimen sliding scale   SubCutaneous Before meals and at bedtime  insulin lispro Injectable (HumaLOG) 7 Unit(s) SubCutaneous three times a day before meals  polyethylene glycol 3350 17 Gram(s) Oral daily  senna 2 Tablet(s) Oral at bedtime  simethicone drops 80 milliGRAM(s) Oral every 4 hours    MEDICATIONS  (PRN):  acetaminophen   Tablet 650 milliGRAM(s) Oral every 6 hours PRN For Temp greater than 38 C (100.4 F)  dextrose Gel 1 Dose(s) Oral once PRN Blood Glucose LESS THAN 70 milliGRAM(s)/deciliter  glucagon  Injectable 1 milliGRAM(s) IntraMuscular once PRN Glucose LESS THAN 70 milligrams/deciliter  ondansetron Injectable 4 milliGRAM(s) IV Push every 6 hours PRN Nausea and/or Vomiting        CAPILLARY BLOOD GLUCOSE  POCT Blood Glucose.: 122 mg/dL (26 Oct 2017 12:45)  POCT Blood Glucose.: 158 mg/dL (26 Oct 2017 08:31)  POCT Blood Glucose.: 128 mg/dL (25 Oct 2017 21:58)  POCT Blood Glucose.: 133 mg/dL (25 Oct 2017 19:59)  POCT Blood Glucose.: 259 mg/dL (25 Oct 2017 17:08)    I&O's Summary    25 Oct 2017 07:01  -  26 Oct 2017 07:00  --------------------------------------------------------  IN: 1140 mL / OUT: 1150 mL / NET: -10 mL    26 Oct 2017 07:01  -  26 Oct 2017 14:58  --------------------------------------------------------  IN: 600 mL / OUT: 0 mL / NET: 600 mL    PHYSICAL EXAM  ICU Vital Signs Last 24 Hrs  T(C): 36.8 (26 Oct 2017 12:00), Max: 37.2 (25 Oct 2017 16:00)  T(F): 98.3 (26 Oct 2017 12:00), Max: 99 (25 Oct 2017 16:00)  HR: 94 (26 Oct 2017 12:00) (94 - 109)  BP: 158/81 (26 Oct 2017 12:00) (136/72 - 158/81)  BP(mean): 98 (25 Oct 2017 19:00) (82 - 98)  ABP: --  ABP(mean): --  RR: 16 (26 Oct 2017 12:00) (14 - 29)  SpO2: 98% (26 Oct 2017 12:00) (97% - 100%)    GENERAL: NAD, well-developed  HEAD:  Atraumatic, Normocephalic  EYES: EOMI, PERRLA, conjunctiva and sclera clear  NECK: Supple, No JVD  CHEST/LUNG: Clear to auscultation bilaterally; No wheeze  HEART: Regular rate and rhythm; No murmurs, rubs, or gallops  ABDOMEN: Soft, Nontender, Nondistended; Bowel sounds present  EXTREMITIES:  2+ Peripheral Pulses, No clubbing, cyanosis, or edema  PSYCH: AAOx3  SKIN: No rashes or lesions    LABS:             9.0    12.2  )-----------( 273      ( 25 Oct 2017 11:21 )             26.6     10-25  142  |  103  |  2<L>  ----------------------------<  99  3.4<L>   |  28  |  0.44<L>    Ca    7.9<L>      25 Oct 2017 04:33  Phos  3.3     10-25  Mg     2.0     10-25    TPro  5.8<L>  /  Alb  2.2<L>  /  TBili  0.3  /  DBili  x   /  AST  25  /  ALT  30  /  AlkPhos  173<H>  10-25              RADIOLOGY & ADDITIONAL TESTS:    Imaging Personally Reviewed:  Consultant(s) Notes Reviewed:    Care Discussed with Consultants/Other Providers:    Raisa Skinner MD   PGY1- Internal Medicine  [phone]

## 2017-10-26 NOTE — PROGRESS NOTE ADULT - PROBLEM SELECTOR PLAN 1
Ecoli bacteremia. Afebrile. BCx 10/24 NGTD.   - will continue abx for 7 day course. Day 5/7  - c/w ceftriaxone 2g IV qd

## 2017-10-27 ENCOUNTER — TRANSCRIPTION ENCOUNTER (OUTPATIENT)
Age: 42
End: 2017-10-27

## 2017-10-27 VITALS
TEMPERATURE: 98 F | RESPIRATION RATE: 18 BRPM | HEART RATE: 103 BPM | OXYGEN SATURATION: 98 % | DIASTOLIC BLOOD PRESSURE: 78 MMHG | SYSTOLIC BLOOD PRESSURE: 132 MMHG

## 2017-10-27 LAB
GLUCOSE BLDC GLUCOMTR-MCNC: 113 MG/DL — HIGH (ref 70–99)
GLUCOSE BLDC GLUCOMTR-MCNC: 216 MG/DL — HIGH (ref 70–99)

## 2017-10-27 PROCEDURE — 80053 COMPREHEN METABOLIC PANEL: CPT

## 2017-10-27 PROCEDURE — 82728 ASSAY OF FERRITIN: CPT

## 2017-10-27 PROCEDURE — 82803 BLOOD GASES ANY COMBINATION: CPT

## 2017-10-27 PROCEDURE — 81001 URINALYSIS AUTO W/SCOPE: CPT

## 2017-10-27 PROCEDURE — 83605 ASSAY OF LACTIC ACID: CPT

## 2017-10-27 PROCEDURE — 85027 COMPLETE CBC AUTOMATED: CPT

## 2017-10-27 PROCEDURE — 82550 ASSAY OF CK (CPK): CPT

## 2017-10-27 PROCEDURE — 82962 GLUCOSE BLOOD TEST: CPT

## 2017-10-27 PROCEDURE — 84295 ASSAY OF SERUM SODIUM: CPT

## 2017-10-27 PROCEDURE — 90686 IIV4 VACC NO PRSV 0.5 ML IM: CPT

## 2017-10-27 PROCEDURE — 83735 ASSAY OF MAGNESIUM: CPT

## 2017-10-27 PROCEDURE — 84484 ASSAY OF TROPONIN QUANT: CPT

## 2017-10-27 PROCEDURE — 87086 URINE CULTURE/COLONY COUNT: CPT

## 2017-10-27 PROCEDURE — 83550 IRON BINDING TEST: CPT

## 2017-10-27 PROCEDURE — 82009 KETONE BODYS QUAL: CPT

## 2017-10-27 PROCEDURE — 80076 HEPATIC FUNCTION PANEL: CPT

## 2017-10-27 PROCEDURE — 80048 BASIC METABOLIC PNL TOTAL CA: CPT

## 2017-10-27 PROCEDURE — 99285 EMERGENCY DEPT VISIT HI MDM: CPT | Mod: 25

## 2017-10-27 PROCEDURE — 99239 HOSP IP/OBS DSCHRG MGMT >30: CPT

## 2017-10-27 PROCEDURE — 83690 ASSAY OF LIPASE: CPT

## 2017-10-27 PROCEDURE — 80061 LIPID PANEL: CPT

## 2017-10-27 PROCEDURE — 74018 RADEX ABDOMEN 1 VIEW: CPT

## 2017-10-27 PROCEDURE — 93005 ELECTROCARDIOGRAM TRACING: CPT

## 2017-10-27 PROCEDURE — 84100 ASSAY OF PHOSPHORUS: CPT

## 2017-10-27 PROCEDURE — 96374 THER/PROPH/DIAG INJ IV PUSH: CPT

## 2017-10-27 PROCEDURE — 85610 PROTHROMBIN TIME: CPT

## 2017-10-27 PROCEDURE — 85730 THROMBOPLASTIN TIME PARTIAL: CPT

## 2017-10-27 PROCEDURE — 85014 HEMATOCRIT: CPT

## 2017-10-27 PROCEDURE — 87150 DNA/RNA AMPLIFIED PROBE: CPT

## 2017-10-27 PROCEDURE — 80307 DRUG TEST PRSMV CHEM ANLYZR: CPT

## 2017-10-27 PROCEDURE — 84145 PROCALCITONIN (PCT): CPT

## 2017-10-27 PROCEDURE — 83036 HEMOGLOBIN GLYCOSYLATED A1C: CPT

## 2017-10-27 PROCEDURE — 82010 KETONE BODYS QUAN: CPT

## 2017-10-27 PROCEDURE — 84132 ASSAY OF SERUM POTASSIUM: CPT

## 2017-10-27 PROCEDURE — 82435 ASSAY OF BLOOD CHLORIDE: CPT

## 2017-10-27 PROCEDURE — 82330 ASSAY OF CALCIUM: CPT

## 2017-10-27 PROCEDURE — 99232 SBSQ HOSP IP/OBS MODERATE 35: CPT

## 2017-10-27 PROCEDURE — 82150 ASSAY OF AMYLASE: CPT

## 2017-10-27 PROCEDURE — 74177 CT ABD & PELVIS W/CONTRAST: CPT

## 2017-10-27 PROCEDURE — 81025 URINE PREGNANCY TEST: CPT

## 2017-10-27 PROCEDURE — 87186 SC STD MICRODIL/AGAR DIL: CPT

## 2017-10-27 PROCEDURE — 82553 CREATINE MB FRACTION: CPT

## 2017-10-27 PROCEDURE — 87040 BLOOD CULTURE FOR BACTERIA: CPT

## 2017-10-27 PROCEDURE — 82565 ASSAY OF CREATININE: CPT

## 2017-10-27 PROCEDURE — 71045 X-RAY EXAM CHEST 1 VIEW: CPT

## 2017-10-27 PROCEDURE — 82947 ASSAY GLUCOSE BLOOD QUANT: CPT

## 2017-10-27 RX ORDER — INSULIN LISPRO 100/ML
6 VIAL (ML) SUBCUTANEOUS
Qty: 0 | Refills: 0 | COMMUNITY

## 2017-10-27 RX ORDER — INSULIN LISPRO 100/ML
7 VIAL (ML) SUBCUTANEOUS
Qty: 428 | Refills: 0 | OUTPATIENT
Start: 2017-10-27 | End: 2017-11-26

## 2017-10-27 RX ORDER — INSULIN GLARGINE 100 [IU]/ML
22 INJECTION, SOLUTION SUBCUTANEOUS
Qty: 137 | Refills: 0 | OUTPATIENT
Start: 2017-10-27 | End: 2017-11-26

## 2017-10-27 RX ORDER — INSULIN LISPRO 100/ML
0 VIAL (ML) SUBCUTANEOUS
Qty: 0 | Refills: 0 | COMMUNITY

## 2017-10-27 RX ORDER — ATORVASTATIN CALCIUM 80 MG/1
1 TABLET, FILM COATED ORAL
Qty: 30 | Refills: 0 | OUTPATIENT
Start: 2017-10-27 | End: 2017-11-26

## 2017-10-27 RX ORDER — CEPHALEXIN 500 MG
1 CAPSULE ORAL
Qty: 8 | Refills: 0 | OUTPATIENT
Start: 2017-10-27 | End: 2017-10-31

## 2017-10-27 RX ADMIN — Medication 100 MILLIGRAM(S): at 05:41

## 2017-10-27 RX ADMIN — Medication 7 UNIT(S): at 09:10

## 2017-10-27 RX ADMIN — Medication 100 MILLIGRAM(S): at 13:36

## 2017-10-27 RX ADMIN — SIMETHICONE 80 MILLIGRAM(S): 80 TABLET, CHEWABLE ORAL at 13:36

## 2017-10-27 RX ADMIN — SIMETHICONE 80 MILLIGRAM(S): 80 TABLET, CHEWABLE ORAL at 05:41

## 2017-10-27 RX ADMIN — Medication 4: at 09:07

## 2017-10-27 RX ADMIN — HEPARIN SODIUM 5000 UNIT(S): 5000 INJECTION INTRAVENOUS; SUBCUTANEOUS at 05:40

## 2017-10-27 RX ADMIN — Medication 7 UNIT(S): at 13:36

## 2017-10-27 RX ADMIN — SIMETHICONE 80 MILLIGRAM(S): 80 TABLET, CHEWABLE ORAL at 02:41

## 2017-10-27 RX ADMIN — INFLUENZA VIRUS VACCINE 0.5 MILLILITER(S): 15; 15; 15; 15 SUSPENSION INTRAMUSCULAR at 10:57

## 2017-10-27 RX ADMIN — SIMETHICONE 80 MILLIGRAM(S): 80 TABLET, CHEWABLE ORAL at 09:08

## 2017-10-27 RX ADMIN — POLYETHYLENE GLYCOL 3350 17 GRAM(S): 17 POWDER, FOR SOLUTION ORAL at 13:34

## 2017-10-27 RX ADMIN — HEPARIN SODIUM 5000 UNIT(S): 5000 INJECTION INTRAVENOUS; SUBCUTANEOUS at 13:36

## 2017-10-27 NOTE — PROGRESS NOTE ADULT - PROBLEM SELECTOR PLAN 2
FS acceptable.   - c/w lantus 21U, humalog 5U premeal, ISS  - atorvastatin 40 mg qd FS acceptable.   - c/w lantus 21U, humalog 7U premeal, ISS  - atorvastatin 40 mg qd

## 2017-10-27 NOTE — DISCHARGE NOTE ADULT - CARE PROVIDERS DIRECT ADDRESSES
[email or web address]

## 2017-10-27 NOTE — PROGRESS NOTE ADULT - SUBJECTIVE AND OBJECTIVE BOX
DIABETES FOLLOW UP NOTE:  INTERVAL HX: 40 y/o F w/h/o uncontrolled T2DM non adherent. Here with SOB/Chest discomfort found to be in DKA and sepsis with E-coli bacteremia. On antibiotic. Tolerating POs. Glycemic control  at goal except for fasting hyperglycemia today. Pt reports she had snack last night. Per primary team pt going home today.    Review of Systems:  Cardiovascular: No chest pain, palpitations  Respiratory: No SOB, no cough  GI: No nausea, vomiting, abdominal pain  Endocrine: no polyuria, polydipsia or S&Sx of hypoglycemia    Allergies    No Known Allergies    Intolerances      MEDICATION:  atorvastatin 40 milliGRAM(s) Oral at bedtime  cefTRIAXone   IVPB 2 Gram(s) IV Intermittent every 24 hours  insulin glargine Injectable (LANTUS) 21 Unit(s) SubCutaneous <User Schedule>  insulin lispro (HumaLOG) corrective regimen sliding scale   SubCutaneous Before meals and at bedtime  insulin lispro Injectable (HumaLOG) 7 Unit(s) SubCutaneous three times a day before meals      PHYSICAL EXAM:  Vital Signs Last 24 Hrs  T(C): 36.7 (10-27-17 @ 05:33), Max: 36.8 (10-26-17 @ 20:45)  T(F): 98 (10-27-17 @ 05:33), Max: 98.2 (10-26-17 @ 20:45)  HR: 104 (10-27-17 @ 05:33) (96 - 104)  BP: 135/76 (10-27-17 @ 05:33) (135/76 - 162/64)  BP(mean): --  RR: 18 (10-27-17 @ 05:33) (18 - 18)  SpO2: 97% (10-27-17 @ 05:33) (97% - 99%)  GENERAL: NAD  Abdomen: Soft, nontender, non distended.  Extremities: Warm, no edema in all 4 exts noted  NEURO: A&Ox3    LABS:  POCT Blood Glucose.: 216 mg/dL (10-27-17 @ 08:47)  POCT Blood Glucose.: 138 mg/dL (10-26-17 @ 21:33)  POCT Blood Glucose.: 134 mg/dL (10-26-17 @ 17:09)  POCT Blood Glucose.: 122 mg/dL (10-26-17 @ 12:45)  POCT Blood Glucose.: 158 mg/dL (10-26-17 @ 08:31)  POCT Blood Glucose.: 128 mg/dL (10-25-17 @ 21:58)  POCT Blood Glucose.: 133 mg/dL (10-25-17 @ 19:59)  POCT Blood Glucose.: 259 mg/dL (10-25-17 @ 17:08)  POCT Blood Glucose.: 230 mg/dL (10-25-17 @ 14:01)                         9.0    12.2  )-----------( 273      ( 25 Oct 2017 11:21 )             26.6       10-25    142  |  103  |  2<L>  ----------------------------<  99  3.4<L>   |  28  |  0.44<L>    EGFR if : 143  EGFR if non : 124    Ca    7.9<L>      10-25  Mg     2.0     10-25  Phos  3.3     10-25    TPro  5.8<L>  /  Alb  2.2<L>  /  TBili  0.3  /  DBili  x   /  AST  25  /  ALT  30  /  AlkPhos  173<H>  10-25      Hemoglobin A1C, Whole Blood: 14.9 % <H> [4.0 - 5.6] (10-22-17 @ 04:53)      10-22 Chol 178  HDL 16<L> Trig 216<H>

## 2017-10-27 NOTE — PROGRESS NOTE ADULT - PROBLEM SELECTOR PROBLEM 2
ACS (acute coronary syndrome)
Diabetes
Diabetes

## 2017-10-27 NOTE — PROGRESS NOTE ADULT - PROBLEM SELECTOR PROBLEM 1
DKA (diabetic ketoacidoses)
Bacteremia
Bacteremia
DKA (diabetic ketoacidoses)
DKA (diabetic ketoacidoses)
Diabetic ketoacidosis without coma associated with type 2 diabetes mellitus
Diabetic ketoacidosis without coma associated with type 2 diabetes mellitus

## 2017-10-27 NOTE — DISCHARGE NOTE ADULT - ADDITIONAL INSTRUCTIONS
Follow up with your primary care doctor within 1-2 weeks of discharge.  Complete the course of antibiotics. Follow up with [address] 11/7/17 at 2:40pm with Dr. Rubalcava.   Complete the 4 day course of keflex 500 mg by mouth every 12 hours.   Follow up with the endocrinologist. Follow up with at [address] [place] [address] 11/7/17 at 2:40pm with Dr. Rubalcava.   Complete the 4 day course of keflex 500 mg by mouth every 12 hours.   Please follow up with endocrinology Nurse educator 11/6/17 10:30 am and endocrinologist Dr. Echavarria 12/4/17 at 2:15 pm

## 2017-10-27 NOTE — PROGRESS NOTE ADULT - SUBJECTIVE AND OBJECTIVE BOX
Patient is a 42y old  Female who presents with a chief complaint of SOB , chest pressure and fatigue (27 Oct 2017 06:01)    SUBJECTIVE / OVERNIGHT EVENTS:      MEDICATIONS  (STANDING):  atorvastatin 40 milliGRAM(s) Oral at bedtime  cefTRIAXone   IVPB 2 Gram(s) IV Intermittent every 24 hours  dextrose 5%. 1000 milliLiter(s) (50 mL/Hr) IV Continuous <Continuous>  dextrose 50% Injectable 12.5 Gram(s) IV Push once  dextrose 50% Injectable 25 Gram(s) IV Push once  dextrose 50% Injectable 25 Gram(s) IV Push once  docusate sodium 100 milliGRAM(s) Oral three times a day  heparin  Injectable 5000 Unit(s) SubCutaneous every 8 hours  influenza   Vaccine 0.5 milliLiter(s) IntraMuscular once  insulin glargine Injectable (LANTUS) 21 Unit(s) SubCutaneous <User Schedule>  insulin lispro (HumaLOG) corrective regimen sliding scale   SubCutaneous Before meals and at bedtime  insulin lispro Injectable (HumaLOG) 7 Unit(s) SubCutaneous three times a day before meals  polyethylene glycol 3350 17 Gram(s) Oral daily  senna 2 Tablet(s) Oral at bedtime  simethicone drops 80 milliGRAM(s) Oral every 4 hours    MEDICATIONS  (PRN):  acetaminophen   Tablet 650 milliGRAM(s) Oral every 6 hours PRN For Temp greater than 38 C (100.4 F)  dextrose Gel 1 Dose(s) Oral once PRN Blood Glucose LESS THAN 70 milliGRAM(s)/deciliter  glucagon  Injectable 1 milliGRAM(s) IntraMuscular once PRN Glucose LESS THAN 70 milligrams/deciliter  ondansetron Injectable 4 milliGRAM(s) IV Push every 6 hours PRN Nausea and/or Vomiting        CAPILLARY BLOOD GLUCOSE  POCT Blood Glucose.: 138 mg/dL (26 Oct 2017 21:33)  POCT Blood Glucose.: 134 mg/dL (26 Oct 2017 17:09)  POCT Blood Glucose.: 122 mg/dL (26 Oct 2017 12:45)  POCT Blood Glucose.: 158 mg/dL (26 Oct 2017 08:31)    I&O's Summary    25 Oct 2017 07:01  -  26 Oct 2017 07:00  --------------------------------------------------------  IN: 1140 mL / OUT: 1150 mL / NET: -10 mL    26 Oct 2017 07:01  -  27 Oct 2017 06:12  --------------------------------------------------------  IN: 940 mL / OUT: 450 mL / NET: 490 mL        PHYSICAL EXAM  ICU Vital Signs Last 24 Hrs  T(C): 36.7 (27 Oct 2017 05:33), Max: 36.8 (26 Oct 2017 12:00)  T(F): 98 (27 Oct 2017 05:33), Max: 98.3 (26 Oct 2017 12:00)  HR: 104 (27 Oct 2017 05:33) (94 - 104)  BP: 135/76 (27 Oct 2017 05:33) (135/76 - 162/64)  BP(mean): --  ABP: --  ABP(mean): --  RR: 18 (27 Oct 2017 05:33) (16 - 18)  SpO2: 97% (27 Oct 2017 05:33) (97% - 99%)    GENERAL: NAD, well-developed  HEAD:  Atraumatic, Normocephalic  EYES: EOMI, PERRLA, conjunctiva and sclera clear  NECK: Supple, No JVD  CHEST/LUNG: Clear to auscultation bilaterally; No wheeze  HEART: Regular rate and rhythm; No murmurs, rubs, or gallops  ABDOMEN: Soft, Nontender, Nondistended; Bowel sounds present  EXTREMITIES:  2+ Peripheral Pulses, No clubbing, cyanosis, or edema  PSYCH: AAOx3  SKIN: No rashes or lesions    LABS:                    RADIOLOGY & ADDITIONAL TESTS:    Imaging Personally Reviewed:  Consultant(s) Notes Reviewed:    Care Discussed with Consultants/Other Providers:    Raisa Skinner MD   PGY1- Internal Medicine  [phone] Patient is a 42y old  Female who presents with a chief complaint of SOB , chest pressure and fatigue (27 Oct 2017 06:01)    SUBJECTIVE / OVERNIGHT EVENTS: Patient continues to have dry cough alleviated by water. Denies dysuria, frequency, CP, palpitations, abdominal pain, n/v, fever, chills.     MEDICATIONS  (STANDING):  atorvastatin 40 milliGRAM(s) Oral at bedtime  cefTRIAXone   IVPB 2 Gram(s) IV Intermittent every 24 hours  dextrose 5%. 1000 milliLiter(s) (50 mL/Hr) IV Continuous <Continuous>  dextrose 50% Injectable 12.5 Gram(s) IV Push once  dextrose 50% Injectable 25 Gram(s) IV Push once  dextrose 50% Injectable 25 Gram(s) IV Push once  docusate sodium 100 milliGRAM(s) Oral three times a day  heparin  Injectable 5000 Unit(s) SubCutaneous every 8 hours  influenza   Vaccine 0.5 milliLiter(s) IntraMuscular once  insulin glargine Injectable (LANTUS) 21 Unit(s) SubCutaneous <User Schedule>  insulin lispro (HumaLOG) corrective regimen sliding scale   SubCutaneous Before meals and at bedtime  insulin lispro Injectable (HumaLOG) 7 Unit(s) SubCutaneous three times a day before meals  polyethylene glycol 3350 17 Gram(s) Oral daily  senna 2 Tablet(s) Oral at bedtime  simethicone drops 80 milliGRAM(s) Oral every 4 hours    MEDICATIONS  (PRN):  acetaminophen   Tablet 650 milliGRAM(s) Oral every 6 hours PRN For Temp greater than 38 C (100.4 F)  dextrose Gel 1 Dose(s) Oral once PRN Blood Glucose LESS THAN 70 milliGRAM(s)/deciliter  glucagon  Injectable 1 milliGRAM(s) IntraMuscular once PRN Glucose LESS THAN 70 milligrams/deciliter  ondansetron Injectable 4 milliGRAM(s) IV Push every 6 hours PRN Nausea and/or Vomiting    CAPILLARY BLOOD GLUCOSE  POCT Blood Glucose.: 138 mg/dL (26 Oct 2017 21:33)  POCT Blood Glucose.: 134 mg/dL (26 Oct 2017 17:09)  POCT Blood Glucose.: 122 mg/dL (26 Oct 2017 12:45)  POCT Blood Glucose.: 158 mg/dL (26 Oct 2017 08:31)      I&O's Summary  25 Oct 2017 07:01  -  26 Oct 2017 07:00  --------------------------------------------------------  IN: 1140 mL / OUT: 1150 mL / NET: -10 mL    26 Oct 2017 07:01  -  27 Oct 2017 06:12  --------------------------------------------------------  IN: 940 mL / OUT: 450 mL / NET: 490 mL      PHYSICAL EXAM  ICU Vital Signs Last 24 Hrs  T(C): 36.7 (27 Oct 2017 05:33), Max: 36.8 (26 Oct 2017 12:00)  T(F): 98 (27 Oct 2017 05:33), Max: 98.3 (26 Oct 2017 12:00)  HR: 104 (27 Oct 2017 05:33) (94 - 104)  BP: 135/76 (27 Oct 2017 05:33) (135/76 - 162/64)  BP(mean): --  ABP: --  ABP(mean): --  RR: 18 (27 Oct 2017 05:33) (16 - 18)  SpO2: 97% (27 Oct 2017 05:33) (97% - 99%)    GENERAL: NAD, well-developed  HEAD:  Atraumatic, Normocephalic  EYES: EOMI, PERRLA, conjunctiva and sclera clear  NECK: Supple, No JVD  CHEST/LUNG: Clear to auscultation bilaterally; No wheeze  HEART: Regular rate and rhythm; No murmurs, rubs, or gallops  ABDOMEN: Soft, Nontender, Nondistended; Bowel sounds present  EXTREMITIES:  2+ Peripheral Pulses, No clubbing, cyanosis, or edema  PSYCH: AAOx3  SKIN: No rashes or lesions    LABS:  Culture - Blood (10.25.17 @ 07:20)    Specimen Source: .Blood Blood-Venous    Culture Results:   No growth to date.              RADIOLOGY & ADDITIONAL TESTS:    Imaging Personally Reviewed:  Consultant(s) Notes Reviewed:    Care Discussed with Consultants/Other Providers:    Raisa Skinner MD   PGY1- Internal Medicine  [phone]

## 2017-10-27 NOTE — DISCHARGE NOTE ADULT - CARE PROVIDER_API CALL
David Rubalcava), Internal Medicine  [address] [place] [address]  Phone: [phone]  Fax: [phone]    Adrian Echavarria), Internal Medicine  [address] [place] [address]  Phone: [phone]  Fax: [phone]

## 2017-10-27 NOTE — DISCHARGE NOTE ADULT - MEDICATION SUMMARY - MEDICATIONS TO STOP TAKING
I will STOP taking the medications listed below when I get home from the hospital:    Lantus 100 units/mL subcutaneous solution  -- 30 unit(s) subcutaneous once a day (at bedtime)    glimepiride  -- 4  by mouth    Tradjenta  --  by mouth    clindamycin 150 mg oral capsule  -- 3 cap(s) by mouth every 6 hours for 10 days  -- Finish all this medication unless otherwise directed by prescriber.  Medication should be taken with plenty of water.    HumaLOG 100 units/mL subcutaneous solution  --  subcutaneous    Keflex 500 mg oral capsule  -- 1 cap(s) by mouth 2 times a day  -- Finish all this medication unless otherwise directed by prescriber.    HumaLOG 100 units/mL subcutaneous solution  -- 6  subcutaneous 3 times a day (before meals)

## 2017-10-27 NOTE — PROGRESS NOTE ADULT - PROBLEM SELECTOR PLAN 1
Ecoli bacteremia. Afebrile. BCx 10/24 NGTD.   - will continue abx for 7 day course. Day 5/7  - c/w ceftriaxone 2g IV qd Ecoli bacteremia. Afebrile. BCx 10/24 NGTD.   - will continue abx for 7 day course. Day 6/7  - switch ceftriaxone 2g IV qd Ecoli bacteremia. Afebrile. BCx 10/24 NGTD.   - will continue abx for 7 day course. Day 7/7  - d/c ceftriaxone 2g IV qd

## 2017-10-27 NOTE — DISCHARGE NOTE ADULT - CARE PLAN
Principal Discharge DX:	Diabetic ketoacidosis without coma associated with type 2 diabetes mellitus  Goal:	Maintenance of normal blood sugar. Please follow up with your primary care doctor within 1-2 weeks of discharge  Instructions for follow-up, activity and diet:	You were admitted because of very high blood sugars. This was caused by not taking your diabetes medications as prescribed. Please continue to take your medications as prescribed and check your blood sugars as directed. If you feel abdominal cramping/pain; notice increased frequency of urination, increased thirst, fatigue; develop a fever please return to the emergency room.  Secondary Diagnosis:	Bacteremia  Goal:	Resolution of blood infection. Please follow up with your primary care doctor within 1-2 weeks of discharge.  Instructions for follow-up, activity and diet:	You were found to have a blood infection caused by a bacteria called E. coli. It was from an unknown source. Please continue to take your antibiotics as prescribed. Principal Discharge DX:	Diabetic ketoacidosis without coma associated with type 2 diabetes mellitus  Goal:	Maintenance of normal blood sugar. Please follow up with endocrinology  Instructions for follow-up, activity and diet:	You were admitted because of very high blood sugars. This was caused by not taking your diabetes medications as prescribed. Please continue to take your medications as prescribed and check your blood sugars as directed. If you feel abdominal cramping/pain; notice increased frequency of urination, increased thirst, fatigue; develop a fever please return to the emergency room.  Secondary Diagnosis:	Bacteremia  Goal:	Resolution of blood infection. Please follow up with Dr. Rubalcava on 11/7/17 at 2:40 pm at [address] [place] [address]  Instructions for follow-up, activity and diet:	You were found to have a blood infection caused by a bacteria called E. coli. It was from an unknown source. Please continue to take your antibiotics as prescribed. Principal Discharge DX:	Diabetic ketoacidosis without coma associated with type 2 diabetes mellitus  Goal:	Maintenance of normal blood sugar. Please follow up with endocrinology Nurse educator 11/6/17 10:30 am and endocrinologist Dr. Echavarria 12/4/17 at 2:15 pm  Instructions for follow-up, activity and diet:	You were admitted because of very high blood sugars. This was caused by not taking your diabetes medications as prescribed. Please continue to take your medications as prescribed and check your blood sugars as directed. If you feel abdominal cramping/pain; notice increased frequency of urination, increased thirst, fatigue; develop a fever please return to the emergency room.  Secondary Diagnosis:	Bacteremia  Goal:	Resolution of blood infection. Please follow up with Dr. Rubalcava on 11/7/17 at 2:40 pm at [address] [place] [address]  Instructions for follow-up, activity and diet:	You were found to have a blood infection caused by a bacteria called E. coli. It was from an unknown source. Please continue to take your antibiotics as prescribed. Principal Discharge DX:	Diabetic ketoacidosis without coma associated with type 2 diabetes mellitus  Goal:	Maintenance of normal blood sugar. Please follow up with endocrinology Nurse educator 11/6/17 10:30 am and endocrinologist Dr. Echavarria 12/4/17 at 2:15 pm  Instructions for follow-up, activity and diet:	You were admitted because of very high blood sugars. This was caused by not taking your diabetes medications as prescribed. Please continue to take your medications as prescribed and check your blood sugars as directed. If you feel abdominal cramping/pain; notice increased frequency of urination, increased thirst, fatigue; develop a fever please return to the emergency room.  Secondary Diagnosis:	Sepsis  Goal:	Resolution of blood infection. Please follow up with Dr. Rubalcava on 11/7/17 at 2:40 pm at [address] [place] [address]  Instructions for follow-up, activity and diet:	You were found to have a blood infection caused by a bacteria called E. coli. It was from an unknown source. Please continue to take your antibiotics as prescribed.

## 2017-10-27 NOTE — PROGRESS NOTE ADULT - ASSESSMENT
40 y/o F w/h/o uncontrolled T2DM non adherent. Here with SOB/Chest discomfort found to be in DKA and sepsis with E-coli bacteremia. On antibiotic. Tolerating POs. Goping home today. Hyperglycemic this am> ate snack last night and reports eating bedtime snacks at home as well. Spoke to pt and team about discharge plan of care as follows.

## 2017-10-27 NOTE — DISCHARGE NOTE ADULT - MEDICATION SUMMARY - MEDICATIONS TO TAKE
I will START or STAY ON the medications listed below when I get home from the hospital:    Lantus 100 units/mL subcutaneous solution  -- 22 unit(s) subcutaneous once a day   -- Do not drink alcoholic beverages when taking this medication.  It is very important that you take or use this exactly as directed.  Do not skip doses or discontinue unless directed by your doctor.  Keep in refrigerator.  Do not freeze.    -- Indication: For Diabetes    HumaLOG 100 units/mL subcutaneous solution  -- 7 unit(s) subcutaneous 3 times a day (before meals)   -- Do not drink alcoholic beverages when taking this medication.  It is very important that you take or use this exactly as directed.  Do not skip doses or discontinue unless directed by your doctor.  Keep in refrigerator.  Do not freeze.    -- Indication: For Diabetes    atorvastatin 40 mg oral tablet  -- 1 tab(s) by mouth once a day (at bedtime)  -- Indication: For Prophylactic measure    cephalexin 500 mg oral tablet  -- 1 tab(s) by mouth every 12 hours   -- Finish all this medication unless otherwise directed by prescriber.    -- Indication: For Bacteremia

## 2017-10-27 NOTE — DISCHARGE NOTE ADULT - PATIENT PORTAL LINK FT
“You can access the Follow[facility] Patient Portal, offered by [facility], by registering with the following website: [email or web address]”

## 2017-10-27 NOTE — PROGRESS NOTE ADULT - PROBLEM SELECTOR PLAN 1
DISCHARGE  -Test BG ac and hs   -Lantus dose 22 units at HS  -Continue Humalog to 7 units ac meals  -Continue Humalog correction scales ac meals while in hospital ONLY!  -Pt instructed to inject 2 units of humalog with bed time snack  -Pt needs RXs for lantus /humalog/ judy insulin pen needles. Reports having DM supplies at home  -Pt has f/u apt with CDE on 11/6/17 at  10:30am and endocrinologist Dr Echavarria on 12/4/17 at 2:15pm at [address] [facility] [address] Phone   -Plan discussed with pt/team.  Contact info: [phone] (24/7). pager [phone]

## 2017-10-27 NOTE — DISCHARGE NOTE ADULT - PLAN OF CARE
Maintenance of normal blood sugar. Please follow up with your primary care doctor within 1-2 weeks of discharge You were admitted because of very high blood sugars. This was caused by not taking your diabetes medications as prescribed. Please continue to take your medications as prescribed and check your blood sugars as directed. If you feel abdominal cramping/pain; notice increased frequency of urination, increased thirst, fatigue; develop a fever please return to the emergency room. Resolution of blood infection. Please follow up with your primary care doctor within 1-2 weeks of discharge. You were found to have a blood infection caused by a bacteria called E. coli. It was from an unknown source. Please continue to take your antibiotics as prescribed. Maintenance of normal blood sugar. Please follow up with endocrinology Resolution of blood infection. Please follow up with Dr. Rubalcava on 11/7/17 at 2:40 pm at [address] [facility] Maintenance of normal blood sugar. Please follow up with endocrinology Nurse educator 11/6/17 10:30 am and endocrinologist Dr. Echavarria 12/4/17 at 2:15 pm

## 2017-10-27 NOTE — DISCHARGE NOTE ADULT - HOSPITAL COURSE
41 yo F with a PMH of IDDM and back abscess 7/2017 presented with abdominal pain, SOB and admitted on 10/21/17 for DKA. Patient was admitted to the MICU where she was managed for DKA. Endocrine was consulted for management of blood glucose. DKA now resolved. Hospital course was persistent  Ecoli bacteremia. Initial blood culture was drawn 10/21/17. Blood cultures cleared 10/24/17. Patient was treated with ceftriaxone. Etiology was unknown. However, suspected urinary source although urine cultures and urinalysis were negative. CT abdomen concerning for hydronephrosis. Patient was deemed stable for discharge.

## 2017-10-28 LAB
CULTURE RESULTS: SIGNIFICANT CHANGE UP
SPECIMEN SOURCE: SIGNIFICANT CHANGE UP

## 2017-10-29 LAB
CULTURE RESULTS: SIGNIFICANT CHANGE UP
CULTURE RESULTS: SIGNIFICANT CHANGE UP
SPECIMEN SOURCE: SIGNIFICANT CHANGE UP
SPECIMEN SOURCE: SIGNIFICANT CHANGE UP

## 2017-11-07 ENCOUNTER — APPOINTMENT (OUTPATIENT)
Dept: INTERNAL MEDICINE | Facility: CLINIC | Age: 42
End: 2017-11-07
Payer: COMMERCIAL

## 2017-11-07 VITALS
WEIGHT: 170 LBS | DIASTOLIC BLOOD PRESSURE: 60 MMHG | HEIGHT: 62 IN | BODY MASS INDEX: 31.28 KG/M2 | SYSTOLIC BLOOD PRESSURE: 124 MMHG

## 2017-11-07 VITALS — HEART RATE: 92 BPM

## 2017-11-07 DIAGNOSIS — K82.8 OTHER SPECIFIED DISEASES OF GALLBLADDER: ICD-10-CM

## 2017-11-07 DIAGNOSIS — D64.9 ANEMIA, UNSPECIFIED: ICD-10-CM

## 2017-11-07 DIAGNOSIS — R78.81 BACTEREMIA: ICD-10-CM

## 2017-11-07 DIAGNOSIS — Z86.79 PERSONAL HISTORY OF OTHER DISEASES OF THE CIRCULATORY SYSTEM: ICD-10-CM

## 2017-11-07 PROCEDURE — 99205 OFFICE O/P NEW HI 60 MIN: CPT

## 2017-11-08 PROBLEM — D64.9 ANEMIA, UNSPECIFIED TYPE: Status: ACTIVE | Noted: 2017-11-07

## 2017-11-08 PROBLEM — K82.8 THICKENING OF WALL OF GALLBLADDER: Status: ACTIVE | Noted: 2017-11-08

## 2017-11-08 PROBLEM — R78.81 BACTEREMIA, ESCHERICHIA COLI: Status: ACTIVE | Noted: 2017-11-08

## 2017-11-13 ENCOUNTER — APPOINTMENT (OUTPATIENT)
Dept: UROLOGY | Facility: CLINIC | Age: 42
End: 2017-11-13
Payer: COMMERCIAL

## 2017-11-13 ENCOUNTER — APPOINTMENT (OUTPATIENT)
Dept: ENDOCRINOLOGY | Facility: CLINIC | Age: 42
End: 2017-11-13
Payer: COMMERCIAL

## 2017-11-13 VITALS — WEIGHT: 170 LBS | HEIGHT: 62 IN | BODY MASS INDEX: 31.28 KG/M2

## 2017-11-13 DIAGNOSIS — N28.9 DISORDER OF KIDNEY AND URETER, UNSPECIFIED: ICD-10-CM

## 2017-11-13 DIAGNOSIS — Z00.00 ENCOUNTER FOR GENERAL ADULT MEDICAL EXAMINATION W/OUT ABNORMAL FINDINGS: ICD-10-CM

## 2017-11-13 LAB
APPEARANCE: CLEAR
BACTERIA: NEGATIVE
BILIRUBIN URINE: NEGATIVE
BLOOD URINE: NEGATIVE
COLOR: YELLOW
GLUCOSE QUALITATIVE U: NEGATIVE MG/DL
HBA1C MFR BLD HPLC: 14.9
KETONES URINE: NEGATIVE
LEUKOCYTE ESTERASE URINE: NEGATIVE
MICROSCOPIC-UA: NORMAL
NITRITE URINE: NEGATIVE
PH URINE: 7
PROTEIN URINE: NEGATIVE MG/DL
RED BLOOD CELLS URINE: 0 /HPF
SPECIFIC GRAVITY URINE: 1.01
SQUAMOUS EPITHELIAL CELLS: 0 /HPF
UROBILINOGEN URINE: NEGATIVE MG/DL
WHITE BLOOD CELLS URINE: 1 /HPF

## 2017-11-13 PROCEDURE — G0108 DIAB MANAGE TRN  PER INDIV: CPT

## 2017-11-13 PROCEDURE — 99203 OFFICE O/P NEW LOW 30 MIN: CPT

## 2017-11-14 LAB
ALBUMIN SERPL ELPH-MCNC: 3.5 G/DL
ALP BLD-CCNC: 61 U/L
ALT SERPL-CCNC: 8 U/L
ANION GAP SERPL CALC-SCNC: 12 MMOL/L
AST SERPL-CCNC: 11 U/L
BASOPHILS # BLD AUTO: 0.02 K/UL
BASOPHILS NFR BLD AUTO: 0.3 %
BILIRUB SERPL-MCNC: 0.2 MG/DL
BUN SERPL-MCNC: 15 MG/DL
CALCIUM SERPL-MCNC: 9.1 MG/DL
CHLORIDE SERPL-SCNC: 103 MMOL/L
CO2 SERPL-SCNC: 25 MMOL/L
CREAT SERPL-MCNC: 0.64 MG/DL
EOSINOPHIL # BLD AUTO: 0.05 K/UL
EOSINOPHIL NFR BLD AUTO: 0.9 %
GLUCOSE SERPL-MCNC: 207 MG/DL
HCT VFR BLD CALC: 29.8 %
HGB BLD-MCNC: 9.3 G/DL
IMM GRANULOCYTES NFR BLD AUTO: 0.3 %
LYMPHOCYTES # BLD AUTO: 1.71 K/UL
LYMPHOCYTES NFR BLD AUTO: 29.8 %
MAN DIFF?: NORMAL
MCHC RBC-ENTMCNC: 26.6 PG
MCHC RBC-ENTMCNC: 31.2 GM/DL
MCV RBC AUTO: 85.4 FL
MONOCYTES # BLD AUTO: 0.37 K/UL
MONOCYTES NFR BLD AUTO: 6.4 %
NEUTROPHILS # BLD AUTO: 3.57 K/UL
NEUTROPHILS NFR BLD AUTO: 62.3 %
PLATELET # BLD AUTO: 378 K/UL
POTASSIUM SERPL-SCNC: 4.1 MMOL/L
PROT SERPL-MCNC: 7.4 G/DL
RBC # BLD: 3.49 M/UL
RBC # FLD: 14.7 %
SODIUM SERPL-SCNC: 140 MMOL/L
WBC # FLD AUTO: 5.74 K/UL

## 2017-11-15 RX ORDER — ELECTROLYTES/DEXTROSE
32G X 4 MM SOLUTION, ORAL ORAL
Qty: 2 | Refills: 0 | Status: ACTIVE | COMMUNITY
Start: 2017-11-13

## 2017-11-17 ENCOUNTER — FORM ENCOUNTER (OUTPATIENT)
Age: 42
End: 2017-11-17

## 2017-11-17 LAB — BACTERIA UR CULT: NORMAL

## 2017-11-18 ENCOUNTER — OUTPATIENT (OUTPATIENT)
Dept: OUTPATIENT SERVICES | Facility: HOSPITAL | Age: 42
LOS: 1 days | End: 2017-11-18
Payer: COMMERCIAL

## 2017-11-18 ENCOUNTER — APPOINTMENT (OUTPATIENT)
Dept: ULTRASOUND IMAGING | Facility: IMAGING CENTER | Age: 42
End: 2017-11-18
Payer: COMMERCIAL

## 2017-11-18 DIAGNOSIS — N28.9 DISORDER OF KIDNEY AND URETER, UNSPECIFIED: ICD-10-CM

## 2017-11-18 PROCEDURE — 76770 US EXAM ABDO BACK WALL COMP: CPT

## 2017-11-18 PROCEDURE — 76770 US EXAM ABDO BACK WALL COMP: CPT | Mod: 26

## 2017-12-04 ENCOUNTER — APPOINTMENT (OUTPATIENT)
Dept: ENDOCRINOLOGY | Facility: CLINIC | Age: 42
End: 2017-12-04
Payer: COMMERCIAL

## 2017-12-04 VITALS
OXYGEN SATURATION: 99 % | WEIGHT: 177 LBS | SYSTOLIC BLOOD PRESSURE: 142 MMHG | HEIGHT: 62 IN | BODY MASS INDEX: 32.57 KG/M2 | HEART RATE: 100 BPM | DIASTOLIC BLOOD PRESSURE: 90 MMHG

## 2017-12-04 DIAGNOSIS — R13.10 DYSPHAGIA, UNSPECIFIED: ICD-10-CM

## 2017-12-04 PROCEDURE — 99244 OFF/OP CNSLTJ NEW/EST MOD 40: CPT

## 2017-12-08 ENCOUNTER — APPOINTMENT (OUTPATIENT)
Dept: OPHTHALMOLOGY | Facility: CLINIC | Age: 42
End: 2017-12-08

## 2017-12-08 ENCOUNTER — APPOINTMENT (OUTPATIENT)
Dept: OPHTHALMOLOGY | Facility: CLINIC | Age: 42
End: 2017-12-08
Payer: COMMERCIAL

## 2017-12-08 DIAGNOSIS — E11.3293 TYPE 2 DIABETES MELLITUS WITH MILD NONPROLIFERATIVE DIABETIC RETINOPATHY WITHOUT MACULAR EDEMA, BILATERAL: ICD-10-CM

## 2017-12-08 DIAGNOSIS — H25.13 AGE-RELATED NUCLEAR CATARACT, BILATERAL: ICD-10-CM

## 2017-12-08 PROCEDURE — 92134 CPTRZ OPH DX IMG PST SGM RTA: CPT

## 2017-12-08 PROCEDURE — 92014 COMPRE OPH EXAM EST PT 1/>: CPT

## 2017-12-18 PROBLEM — H25.13 AGE-RELATED NUCLEAR CATARACT OF BOTH EYES: Status: ACTIVE | Noted: 2017-12-18

## 2017-12-18 PROBLEM — E11.3293 MILD NONPROLIFERATIVE DIABETIC RETINOPATHY OF BOTH EYES WITHOUT MACULAR EDEMA ASSOCIATED WITH TYPE 2 DIABETES MELLITUS: Status: ACTIVE | Noted: 2017-12-18

## 2018-01-03 ENCOUNTER — APPOINTMENT (OUTPATIENT)
Dept: ULTRASOUND IMAGING | Facility: IMAGING CENTER | Age: 43
End: 2018-01-03
Payer: COMMERCIAL

## 2018-01-03 ENCOUNTER — OUTPATIENT (OUTPATIENT)
Dept: OUTPATIENT SERVICES | Facility: HOSPITAL | Age: 43
LOS: 1 days | End: 2018-01-03
Payer: COMMERCIAL

## 2018-01-03 DIAGNOSIS — Z00.8 ENCOUNTER FOR OTHER GENERAL EXAMINATION: ICD-10-CM

## 2018-01-03 PROCEDURE — 76536 US EXAM OF HEAD AND NECK: CPT

## 2018-01-03 PROCEDURE — 76536 US EXAM OF HEAD AND NECK: CPT | Mod: 26

## 2018-03-05 ENCOUNTER — APPOINTMENT (OUTPATIENT)
Dept: ENDOCRINOLOGY | Facility: CLINIC | Age: 43
End: 2018-03-05

## 2018-03-22 ENCOUNTER — APPOINTMENT (OUTPATIENT)
Dept: UROLOGY | Facility: CLINIC | Age: 43
End: 2018-03-22

## 2018-03-23 ENCOUNTER — APPOINTMENT (OUTPATIENT)
Dept: ENDOCRINOLOGY | Facility: CLINIC | Age: 43
End: 2018-03-23
Payer: COMMERCIAL

## 2018-03-23 VITALS
BODY MASS INDEX: 24.38 KG/M2 | OXYGEN SATURATION: 99 % | DIASTOLIC BLOOD PRESSURE: 70 MMHG | HEIGHT: 72 IN | SYSTOLIC BLOOD PRESSURE: 126 MMHG | RESPIRATION RATE: 16 BRPM | HEART RATE: 94 BPM | WEIGHT: 180 LBS

## 2018-03-23 DIAGNOSIS — H35.033 HYPERTENSIVE RETINOPATHY, BILATERAL: ICD-10-CM

## 2018-03-23 DIAGNOSIS — E11.65 TYPE 2 DIABETES MELLITUS WITH HYPERGLYCEMIA: ICD-10-CM

## 2018-03-23 LAB
GLUCOSE BLDC GLUCOMTR-MCNC: 135
HBA1C MFR BLD HPLC: 11.5

## 2018-03-23 PROCEDURE — 99214 OFFICE O/P EST MOD 30 MIN: CPT

## 2018-03-23 PROCEDURE — 83036 HEMOGLOBIN GLYCOSYLATED A1C: CPT | Mod: QW

## 2018-03-23 PROCEDURE — 82962 GLUCOSE BLOOD TEST: CPT

## 2018-03-23 RX ORDER — INSULIN ASPART 100 [IU]/ML
100 INJECTION, SOLUTION INTRAVENOUS; SUBCUTANEOUS
Qty: 5 | Refills: 5 | Status: ACTIVE | COMMUNITY
Start: 2017-11-13 | End: 1900-01-01

## 2018-03-23 RX ORDER — LANCETS
EACH MISCELLANEOUS
Qty: 2 | Refills: 5 | Status: ACTIVE | COMMUNITY
Start: 2018-03-23 | End: 1900-01-01

## 2018-03-23 RX ORDER — INSULIN GLARGINE 100 [IU]/ML
100 INJECTION, SOLUTION SUBCUTANEOUS
Qty: 5 | Refills: 5 | Status: ACTIVE | COMMUNITY
Start: 2017-11-13 | End: 1900-01-01

## 2018-04-11 ENCOUNTER — APPOINTMENT (OUTPATIENT)
Dept: OPHTHALMOLOGY | Facility: CLINIC | Age: 43
End: 2018-04-11

## 2018-05-10 ENCOUNTER — APPOINTMENT (OUTPATIENT)
Dept: UROLOGY | Facility: CLINIC | Age: 43
End: 2018-05-10

## 2018-06-29 ENCOUNTER — APPOINTMENT (OUTPATIENT)
Dept: ENDOCRINOLOGY | Facility: CLINIC | Age: 43
End: 2018-06-29

## 2018-10-02 ENCOUNTER — RESULT REVIEW (OUTPATIENT)
Age: 43
End: 2018-10-02

## 2019-05-07 ENCOUNTER — RX RENEWAL (OUTPATIENT)
Age: 44
End: 2019-05-07

## 2019-10-17 ENCOUNTER — APPOINTMENT (OUTPATIENT)
Dept: ENDOCRINOLOGY | Facility: CLINIC | Age: 44
End: 2019-10-17

## 2021-01-02 NOTE — ED ADULT TRIAGE NOTE - MODE OF ARRIVAL
She is having symptoms sore throat, stuffy nose, headache, and slightly chest tightness  Her work will not let her back unless the Dr states it is just a common cold or if she is tested for COVID  Private Vehicle

## 2021-06-24 ENCOUNTER — EMERGENCY (EMERGENCY)
Facility: HOSPITAL | Age: 46
LOS: 1 days | Discharge: HOME | End: 2021-06-24
Attending: EMERGENCY MEDICINE
Payer: COMMERCIAL

## 2021-06-24 VITALS
RESPIRATION RATE: 16 BRPM | DIASTOLIC BLOOD PRESSURE: 80 MMHG | SYSTOLIC BLOOD PRESSURE: 135 MMHG | TEMPERATURE: 99 F | OXYGEN SATURATION: 98 % | HEIGHT: 62 IN | WEIGHT: 171.08 LBS | HEART RATE: 97 BPM

## 2021-06-24 PROCEDURE — 99285 EMERGENCY DEPT VISIT HI MDM: CPT

## 2021-06-25 ENCOUNTER — APPOINTMENT (OUTPATIENT)
Dept: OPHTHALMOLOGY | Facility: CLINIC | Age: 46
End: 2021-06-25

## 2021-06-25 VITALS
SYSTOLIC BLOOD PRESSURE: 120 MMHG | OXYGEN SATURATION: 98 % | TEMPERATURE: 98 F | HEART RATE: 78 BPM | RESPIRATION RATE: 16 BRPM | DIASTOLIC BLOOD PRESSURE: 71 MMHG

## 2021-06-25 LAB
CRP SERPL-MCNC: <3 MG/L — SIGNIFICANT CHANGE UP (ref 0–4)
ERYTHROCYTE [SEDIMENTATION RATE] IN BLOOD: 49 MM/HR — HIGH (ref 0–15)

## 2021-06-25 PROCEDURE — 70450 CT HEAD/BRAIN W/O DYE: CPT | Mod: 26,MA

## 2021-06-25 PROCEDURE — 70480 CT ORBIT/EAR/FOSSA W/O DYE: CPT

## 2021-06-25 PROCEDURE — 82962 GLUCOSE BLOOD TEST: CPT

## 2021-06-25 PROCEDURE — 99284 EMERGENCY DEPT VISIT MOD MDM: CPT | Mod: 25

## 2021-06-25 PROCEDURE — 85652 RBC SED RATE AUTOMATED: CPT

## 2021-06-25 PROCEDURE — 70480 CT ORBIT/EAR/FOSSA W/O DYE: CPT | Mod: 26,59,MA

## 2021-06-25 PROCEDURE — 96374 THER/PROPH/DIAG INJ IV PUSH: CPT

## 2021-06-25 PROCEDURE — 70450 CT HEAD/BRAIN W/O DYE: CPT

## 2021-06-25 PROCEDURE — 86140 C-REACTIVE PROTEIN: CPT

## 2021-06-25 RX ORDER — PREDNISOLONE SODIUM PHOSPHATE 1 %
1 DROPS OPHTHALMIC (EYE)
Qty: 1 | Refills: 0
Start: 2021-06-25 | End: 2021-07-08

## 2021-06-25 RX ORDER — INSULIN GLARGINE 100 [IU]/ML
30 INJECTION, SOLUTION SUBCUTANEOUS AT BEDTIME
Refills: 0 | Status: DISCONTINUED | OUTPATIENT
Start: 2021-06-25 | End: 2021-06-28

## 2021-06-25 RX ORDER — CYCLOPENTOLATE HYDROCHLORIDE 10 MG/ML
1 SOLUTION/ DROPS OPHTHALMIC
Qty: 1 | Refills: 0
Start: 2021-06-25 | End: 2021-07-08

## 2021-06-25 RX ORDER — METOCLOPRAMIDE HCL 10 MG
10 TABLET ORAL ONCE
Refills: 0 | Status: COMPLETED | OUTPATIENT
Start: 2021-06-25 | End: 2021-06-25

## 2021-06-25 RX ORDER — ACETAMINOPHEN 500 MG
650 TABLET ORAL ONCE
Refills: 0 | Status: COMPLETED | OUTPATIENT
Start: 2021-06-25 | End: 2021-06-25

## 2021-06-25 RX ADMIN — Medication 1 DROP(S): at 02:15

## 2021-06-25 RX ADMIN — INSULIN GLARGINE 30 UNIT(S): 100 INJECTION, SOLUTION SUBCUTANEOUS at 01:23

## 2021-06-25 RX ADMIN — Medication 10 MILLIGRAM(S): at 02:49

## 2021-06-25 RX ADMIN — Medication 650 MILLIGRAM(S): at 02:15

## 2021-06-25 NOTE — ED PROVIDER NOTE - NS ED ROS FT
GENERAL: No fever, chills  EYES: + R eye blurry vision, no discharge.   ENT: no difficulty swallowing or speaking   CARDIAC: no chest pain/pressure, SOB, lower extremity swelling  PULMONARY: no cough, SOB  GI: no abdominal pain, n/v/d  : no dysuria, no hematuria  SKIN: no rashes, no ecchymosis  NEURO: + headache, no lightheadedness  MSK: No joint pain, myalgia, weakness.

## 2021-06-25 NOTE — ED PROVIDER NOTE - OBJECTIVE STATEMENT
47 yo F hx of DM on insulin, set in from  for r/o acute angle closure glaucoma. 3 days ago, pt awoke to right eye pain, blurry vision, and right sided headache, and photophobia. Symptoms progressively got worse, denies nausea, vomiting, focal neuro symptoms. Pt also denies frequent headaches. In the ED pt appears uncomfortable.

## 2021-06-25 NOTE — ED PROVIDER NOTE - PROGRESS NOTE DETAILS
Jessica Sarah M.D. Resident  [facility] called, currently with another patient at another hospital. Will see patient when available. Jessica Sarah M.D. Resident  Ophtho with patient. Brooklyn Huang, resident MD: pt was evaluated by ophthalmology and exam was consistent with uveitis. recommended eye drops. discussed results and medications with pt. will discharge patient home at this time. printed out copies of results for patient to take home. discussed return precautions and need for outpatient follow up.

## 2021-06-25 NOTE — ED PROVIDER NOTE - PHYSICAL EXAMINATION
GENl: Patient awake alert NAD.   HEENT: normocephalic, atraumatic, b/l pupils 3mm brisk, 20/200 right 20/40 Left, + mild weakness right lateral and downward gaze in right eye, no corneal haze, no conjunctival injection.   CARDIAC: RRR, S1, S2, no murmur.   PULM: CTA B/L no wheeze, rhonchi, rales.   ABD: soft NT, ND, no rebound no guarding  MSK: Moving all extremities, no edema. 5/5 strength and full ROM in all extremities.   NEURO: A&Ox3, gait normal, no focal neurological deficits other than right extraocular muscle weakness.   SKIN: warm, dry, no rash.

## 2021-06-25 NOTE — ED PROVIDER NOTE - NSFOLLOWUPCLINICS_GEN_ALL_ED_FT
[facility] Ophthalmology  Ophthalmology  [address] [place] [address], [facility] [[email or web address]] [address]  [place] [address]  Phone: [phone]  Fax:

## 2021-06-25 NOTE — ED PROVIDER NOTE - CLINICAL SUMMARY MEDICAL DECISION MAKING FREE TEXT BOX
47 yo F hx of DM on insulin, set in from  for r/o acute angle closure glaucoma. Also possible uveiutis, giant cell arteritis, intracranial process. CT head and orbits, inflammatory markers, ophtho consult, likely DC home with outpt followup.

## 2021-06-25 NOTE — ED ADULT NURSE REASSESSMENT NOTE - NS ED NURSE REASSESS COMMENT FT1
received report from ER RN Brenda. No primary note written by night RN. Patient at opthalmology room upon day shift arrival.

## 2021-06-25 NOTE — ED PROVIDER NOTE - NSFOLLOWUPINSTRUCTIONS_ED_ALL_ED_FT
You were seen and evaluated in the emergency room for eye pain.    Please follow up with your ophthalmologist in the next few days. Call the number attached to make an appointment.     Use the 2 eye drops as prescribed.    Continue all other home medications as prescribed.    Seek immediate medical attention for any worsening, new, or concerning symptoms.    Please read all attached. You were seen and evaluated in the emergency room for eye pain.    Please follow up with your ophthalmologist today. You can go around 1pm today at the address attached.    Use the 2 eye drops as prescribed.    Continue all other home medications as prescribed.    Seek immediate medical attention for any worsening, new, or concerning symptoms.    Please read all attached.

## 2021-06-25 NOTE — CONSULT NOTE ADULT - SUBJECTIVE AND OBJECTIVE BOX
[facility] DEPARTMENT OF OPHTHALMOLOGY - INITIAL ADULT CONSULT  -----------------------------------------------------------------------------------------------------------------  Bruce Schmitz MD PGY 2  Pager: [phone]  -----------------------------------------------------------------------------------------------------------------    HPI: Patient is 46 year old female with PMH DM on insulin comes to ED with painful eye. The patient reports the pain as stabbing in nature, severe, and has been progressing over the past 3 days. The patient reports associated photophobia and blurry vision. The patient denies having episodes like this prior. The patient reports having swelling for allergies in the past, but never had this pain and light sensitivity. The patient cannot identify any inciting event.  The patient denies trauma, Recent illness, and all ROS negative.  The patient reports traveling to [place] ~1 month ago.   The patient reports having COVID Vaccine in February.    Ophthalmology consulted for the above.    PAST MEDICAL & SURGICAL HISTORY:  Diabetes mellitus type II    Anemia    Diabetes    C section  2008    S/P laparoscopic procedure  D&amp;C Hysteroscopy, Laparoscopy, RSO/ Excision Right Tubo-ovarian abscess 7/19    No significant past surgical history      Past Ocular History: reports having glasses as a kid, but hasnt worn since then. Denies any Ocular surgeries.. Had Ophthalmology appointment about 2 years ago.     Family History:  FAMILY HISTORY:  No pertinent family history in first degree relatives      Ophthalmic Medications: N/A    MEDICATIONS  (STANDING):  insulin glargine Injectable (LANTUS) 30 Unit(s) SubCutaneous at bedtime    MEDICATIONS  (PRN):    Allergies & Intolerances:     Review of Systems:  Constitutional: No fever, chills, HA weight loss  Eyes: As above  Neuro: No tremors  Cardiovascular: No chest pain, palpitations  Respiratory: No SOB, no cough no recent URI   GI: No nausea, vomiting, abdominal pain  : No dysuria, No recent UTI  Skin: no rash  Psych: no depression  Endocrine: no polyuria, polydipsia  Heme/lymph: no swelling    VITALS: T(C): 36.9 (06-25-21 @ 04:32)  T(F): 98.4 (06-25-21 @ 04:32), Max: 98.6 (06-24-21 @ 22:41)  HR: 84 (06-25-21 @ 04:32) (84 - 97)  BP: 109/71 (06-25-21 @ 04:32) (109/71 - 136/89)  RR:  (16 - 18)  SpO2:  (98% - 100%)  Wt(kg): --  General: AAO x 3, appropriate mood and affect    Ophthalmology Exam:  Visual acuity (cc near ): 20/2100 ph 20/40 OD 20/30 OS.  Pupils: PERRL OU, no APD  Tonometry:  14, 13  Extraocular movements (EOMs): Full OU, no pain, no diplopia.  Confrontational Visual Field (CVF): Grossly Full (Difficult to assess due to pain) OD Full OS  Color Plates: 10/12.    Pen Light Exam (PLE)  External: Normal OU.  Lids/Lashes/Lacrimal Ducts: Tr edema  with mild tenderness when pressing on the globe OD Flat OS  Sclera/Conjunctiva: Trace injection OD White and Quiet OS.  Cornea: Tr d-folds with some areas of Gutata, no infiltrate no distinct KP Small Inf spk OD Inf spk OS  Anterior Chamber: 1-2 + AC cell and flare no hypopyon OD Deep and quiet OS  Iris: Flat OU.  Lens: 2-+ NS, CC, PSC OU.    Fundus Exam: dilated with 1% tropicamide and 2.5% phenylephrine  Approval obtained from primary team for dilation  Patient aware that pupils can remained dilated for at least 4-6 hours.  Exam performed with 20 D lens    Vitreous: Appears clear wnl OU  Disc, cup/disc: sharp and pink, 0.35 OU  Macula: wnl OU  Vessels: wnl OU  Periphery: One DB noteed Superior [place] OD One CWS noted Inferior [place] OS    Labs/Imaging:  < from: CT Head No Cont (06.25.21 @ 01:59) >  IMPRESSION:    No acute intracranial hemorrhage or mass effect.    < end of copied text >  < from: CT Orbit No Cont (06.25.21 @ 02:13) >  IMPRESSION:    Unremarkable noncontrast CT orbits.    < end of copied text >    ESR 49   CRP <3   [facility] DEPARTMENT OF OPHTHALMOLOGY - INITIAL ADULT CONSULT  -----------------------------------------------------------------------------------------------------------------  Bruce Schmitz MD PGY 2  Pager: [phone]  -----------------------------------------------------------------------------------------------------------------    HPI: Patient is 46 year old female with PMH DM on insulin comes to ED with painful eye. The patient reports the pain as stabbing in nature, severe, and has been progressing over the past 3 days. The patient reports associated photophobia and blurry vision. The patient denies having episodes like this prior. The patient reports having swelling for allergies in the past, but never had this pain and light sensitivity. The patient cannot identify any inciting event.  The patient denies trauma, Recent illness, and all ROS negative.  The patient reports traveling to [place] ~1 month ago.   The patient reports having COVID Vaccine in February.    Ophthalmology consulted for the above.    PAST MEDICAL & SURGICAL HISTORY:  Diabetes mellitus type II    Anemia    Diabetes    C section  2008    S/P laparoscopic procedure  D&amp;C Hysteroscopy, Laparoscopy, RSO/ Excision Right Tubo-ovarian abscess 7/19    No significant past surgical history      Past Ocular History: reports having glasses as a kid, but hasnt worn since then. Denies any Ocular surgeries.. Had Ophthalmology appointment about 2 years ago.     Family History:  FAMILY HISTORY:  No pertinent family history in first degree relatives      Ophthalmic Medications: N/A    MEDICATIONS  (STANDING):  insulin glargine Injectable (LANTUS) 30 Unit(s) SubCutaneous at bedtime    MEDICATIONS  (PRN):    Allergies & Intolerances:     Review of Systems:  Constitutional: No fever, chills, HA weight loss  Eyes: As above  Neuro: No tremors  Cardiovascular: No chest pain, palpitations  Respiratory: No SOB, no cough no recent URI   GI: No nausea, vomiting, abdominal pain  : No dysuria, No recent UTI  Skin: no rash  Psych: no depression  Endocrine: no polyuria, polydipsia  Heme/lymph: no swelling    VITALS: T(C): 36.9 (06-25-21 @ 04:32)  T(F): 98.4 (06-25-21 @ 04:32), Max: 98.6 (06-24-21 @ 22:41)  HR: 84 (06-25-21 @ 04:32) (84 - 97)  BP: 109/71 (06-25-21 @ 04:32) (109/71 - 136/89)  RR:  (16 - 18)  SpO2:  (98% - 100%)  Wt(kg): --  General: AAO x 3, appropriate mood and affect    Ophthalmology Exam:  Visual acuity (cc near ): 20/2100 ph 20/40 OD 20/30 OS.  Pupils: PERRL OU, no APD  Tonometry:  14, 13  Extraocular movements (EOMs): Full OU, no pain, no diplopia.  Confrontational Visual Field (CVF): Grossly Full (Difficult to assess due to pain) OD Full OS  Color Plates: 10/12.    Pen Light Exam (PLE)  External: Normal OU.  Lids/Lashes/Lacrimal Ducts: Tr edema  with mild tenderness when pressing on the globe OD Flat OS  Sclera/Conjunctiva: Trace injection OD White and Quiet OS.  Cornea: Tr d-folds with some areas of Gutata, no infiltrate no distinct KP Small Inf spk OD Inf spk OS  Anterior Chamber: 1-2 + AC cell and flare no hypopyon OD Deep and quiet OS  Iris: Flat OU.  Lens: 2-+ NS, CC, PSC OU.    Fundus Exam: dilated with 1% tropicamide and 2.5% phenylephrine  Approval obtained from primary team for dilation  Patient aware that pupils can remained dilated for at least 4-6 hours.  Exam performed with 20 D lens    Vitreous: Appears clear wnl OU  Disc, cup/disc: sharp and pink, 0.35 OU  Macula: wnl OU  Vessels: wnl OU  Periphery: One DB noteed Superior [place] OD One CWS noted Inferior [place] OS    Labs/Imaging:  < from: CT Head No Cont (06.25.21 @ 01:59) >  IMPRESSION:    No acute intracranial hemorrhage or mass effect.    < end of copied text >  < from: CT Orbit No Cont (06.25.21 @ 02:13) >  IMPRESSION:    Unremarkable noncontrast CT orbits.    < end of copied text >    ESR 49   CRP <3   [facility] DEPARTMENT OF OPHTHALMOLOGY - INITIAL ADULT CONSULT  -----------------------------------------------------------------------------------------------------------------  Bruce Schmitz MD PGY 2  Pager: [phone]  -----------------------------------------------------------------------------------------------------------------    HPI: Patient is 46 year old female with PMH DM on insulin comes to ED with painful eye. The patient reports the pain as stabbing in nature, severe, and has been progressing over the past 3 days. The patient reports associated photophobia and blurry vision. The patient denies having episodes like this prior. The patient reports having swelling for allergies in the past, but never had this pain and light sensitivity. The patient cannot identify any inciting event.  The patient denies trauma, Recent illness, and all ROS negative.  The patient reports traveling to [place] ~1 month ago.   The patient reports having COVID Vaccine in February.    Ophthalmology consulted for the above.    PAST MEDICAL & SURGICAL HISTORY:  Diabetes mellitus type II    Anemia    Diabetes    C section  2008    S/P laparoscopic procedure  D&amp;C Hysteroscopy, Laparoscopy, RSO/ Excision Right Tubo-ovarian abscess 7/19    No significant past surgical history      Past Ocular History: reports having glasses as a kid, but hasnt worn since then. Denies any Ocular surgeries.. Had Ophthalmology appointment about 2 years ago.     Family History:  FAMILY HISTORY:  No pertinent family history in first degree relatives      Ophthalmic Medications: N/A    MEDICATIONS  (STANDING):  insulin glargine Injectable (LANTUS) 30 Unit(s) SubCutaneous at bedtime    MEDICATIONS  (PRN):    Allergies & Intolerances:     Review of Systems:  Constitutional: No fever, chills, HA weight loss  Eyes: As above  Neuro: No tremors  Cardiovascular: No chest pain, palpitations  Respiratory: No SOB, no cough no recent URI   GI: No nausea, vomiting, abdominal pain  : No dysuria, No recent UTI  Skin: no rash  Psych: no depression  Endocrine: no polyuria, polydipsia  Heme/lymph: no swelling    VITALS: T(C): 36.9 (06-25-21 @ 04:32)  T(F): 98.4 (06-25-21 @ 04:32), Max: 98.6 (06-24-21 @ 22:41)  HR: 84 (06-25-21 @ 04:32) (84 - 97)  BP: 109/71 (06-25-21 @ 04:32) (109/71 - 136/89)  RR:  (16 - 18)  SpO2:  (98% - 100%)  Wt(kg): --  General: AAO x 3, appropriate mood and affect    Ophthalmology Exam:  Visual acuity (cc near ): 20/2100 ph 20/40 OD 20/30 OS.  Pupils: PERRL OU, no APD  Tonometry:  14, 13  Extraocular movements (EOMs): Full OU, no pain, no diplopia.  Confrontational Visual Field (CVF): Grossly Full (Difficult to assess due to pain) OD Full OS  Color Plates: 10/12.    Pen Light Exam (PLE)  External: Normal OU.  Lids/Lashes/Lacrimal Ducts: Tr edema  with mild tenderness when pressing on the globe OD Flat OS  Sclera/Conjunctiva: Trace injection OD White and Quiet OS.  Cornea: Tr d-folds with some areas of Gutata, no infiltrate no distinct KP Small Inf spk OD Inf spk OS  Anterior Chamber: 1-2 + AC cell and flare no hypopyon OD Deep and quiet OS  Iris: Flat OU.  Lens: 2-+ NS, CC, PSC OU.    Fundus Exam: dilated with 1% tropicamide and 2.5% phenylephrine  Approval obtained from primary team for dilation  Patient aware that pupils can remained dilated for at least 4-6 hours.  Exam performed with 20 D lens    Vitreous: Appears clear wnl OU  Disc, cup/disc: sharp and pink, 0.35 OU  Macula: wnl OU  Vessels: wnl OU  Periphery: One DB noteed Superior [place] OD One CWS noted Inferior [place] OS    Labs/Imaging:  < from: CT Head No Cont (06.25.21 @ 01:59) >  IMPRESSION:    No acute intracranial hemorrhage or mass effect.    < end of copied text >  < from: CT Orbit No Cont (06.25.21 @ 02:13) >  IMPRESSION:    Unremarkable noncontrast CT orbits.    < end of copied text >    ESR 49   CRP <3

## 2021-06-25 NOTE — ED PROVIDER NOTE - ATTENDING CONTRIBUTION TO CARE
Pt with blurry vision and decreased visual acuity to R eye, questionable EOM weakness on R eye as above, PERRL. exam not completely consistent with AACG. CT neg. neg CRP low concern for GCA. awaiting opthalmology eval for further diagnosis and recommendation for treatment.

## 2021-06-25 NOTE — ED PROVIDER NOTE - PATIENT PORTAL LINK FT
You can access the FollowMyHealth Patient Portal offered by [facility] by registering at the following website: [email or web address]. By joining [employer]’s FollowMyHealth portal, you will also be able to view your health information using other applications (apps) compatible with our system.

## 2021-06-25 NOTE — CONSULT NOTE ADULT - ASSESSMENT
Assessment and Recommendations:  46y female with a past medical history/ocular history of DM consulted for Painful Right eye, found to have Anterior Uveitis.  20/2100 ph 20/40 OD 20/30 OS. PERRL OU, no APD IOP 14, 13 tr edema  with mild tenderness when pressing on the globe OD Trace injection Tr d-folds with some areas of Gutata, no infiltrate no distinct KP Small Inf spk,  1-2 + AC cell and flare no hypopyon , No post synch,  2-+ NS, CC, PSC OU. Left eye other wise normal anterior exam. DFE with .35 CDR OU-  One DB noteed Superior [place] OD One CWS noted Inferior [place] OS.    # Anterior Uveitis Right eye.  - Patient with anterior chamber inflammation photophobia, blurry vision.   - Etiology unknown at this time - Likely idiopathic. will Monitor as outpatient today and will send blood work at later date if no improvement  or recurrence.  - Start Pred Forte QID Right eye  - Start Cyclogyl TID Right eye.   - Patient to follow up in Clinic today as below.     # Mild NPDR  - 1 DB  OD and CWS OS.  - Blood/Glucose Control as per PMD  - A1C Goal < 7  - WIll monitor as outpatient    # Cataract  - Difficulty to assess Visual significance in Acute setting.   - Patient symptomatic with blurry vision (Prior to this episode) and Night time glare affecting ability to drive  - WIll follow as outpatient - Can discuss surgical options after acute illness resolves.     1. Dry eye syndrome of both eyes  - artificial tears, one drop to both eyes, 4 times a day  - artificial tear ointment, a small amount into both eyes, twice a day       Outpatient Follow-up: Patient should follow-up with his/her ophthalmologist or with [facility] Department of Ophthalmology within 1 week of after discharge at:    [address]  [place] [address]  [phone]    Bruce Schmitz MD, PGY-2  Pager: [phone]  Also available on [employer] Teams Assessment and Recommendations:  46y female with a past medical history/ocular history of DM consulted for Painful Right eye, found to have Anterior Uveitis.  20/2100 ph 20/40 OD 20/30 OS. PERRL OU, no APD IOP 14, 13 tr edema  with mild tenderness when pressing on the globe OD Trace injection Tr d-folds with some areas of Gutata, no infiltrate no distinct KP Small Inf spk,  1-2 + AC cell and flare no hypopyon , No post synch,  2-+ NS, CC, PSC OU. Left eye other wise normal anterior exam. DFE with .35 CDR OU-  One DB noteed Superior [place] OD One CWS noted Inferior [place] OS.    # Anterior Uveitis Right eye.  - Patient with anterior chamber inflammation photophobia, blurry vision.   - Etiology unknown at this time - Likely idiopathic. will Monitor as outpatient today and will send blood work at later date if no improvement  or recurrence.  - Start Pred Forte QID Right eye  - Start Cyclogyl TID Right eye.   - Patient to follow up in Clinic today as below.     # Mild NPDR  - 1 DB  OD and CWS OS.  - Blood/Glucose Control as per PMD  - A1C Goal < 7  - WIll monitor as outpatient    # Cataract  - Difficulty to assess Visual significance in Acute setting.   - Patient symptomatic with blurry vision (Prior to this episode) and Night time glare affecting ability to drive  - WIll follow as outpatient - Can discuss surgical options after acute illness resolves.     1. Dry eye syndrome of both eyes  - artificial tears, one drop to both eyes, 4 times a day  - artificial tear ointment, a small amount into both eyes, twice a day       Outpatient Follow-up: Patient should follow-up with his/her ophthalmologist or with [facility] Department of Ophthalmology within 1 week of after discharge at:    [address]  [place] [address]  [phone]    Bruce Schmitz MD, PGY-2  Pager: [phone]  Also available on [employer] Teams Assessment and Recommendations:  46y female with a past medical history/ocular history of DM consulted for Painful Right eye, found to have Anterior Uveitis.  20/2100 ph 20/40 OD 20/30 OS. PERRL OU, no APD IOP 14, 13 tr edema  with mild tenderness when pressing on the globe OD Trace injection Tr d-folds with some areas of Gutata, no infiltrate no distinct KP Small Inf spk,  1-2 + AC cell and flare no hypopyon , No post synch,  2-+ NS, CC, PSC OU. Left eye other wise normal anterior exam. DFE with .35 CDR OU-  One DB noteed Superior [place] OD One CWS noted Inferior [place] OS.    # Anterior Uveitis Right eye.  - Patient with anterior chamber inflammation photophobia, blurry vision.   - Etiology unknown at this time - Likely idiopathic. will Monitor as outpatient today and will send blood work at later date if no improvement  or recurrence.  - Start Pred Forte QID Right eye  - Start Cyclogyl TID Right eye.   - Patient to follow up in Clinic today as below.     # Mild NPDR  - 1 DB  OD and CWS OS.  - Blood/Glucose Control as per PMD  - A1C Goal < 7  - WIll monitor as outpatient    # Cataract  - Difficulty to assess Visual significance in Acute setting.   - Patient symptomatic with blurry vision (Prior to this episode) and Night time glare affecting ability to drive  - WIll follow as outpatient - Can discuss surgical options after acute illness resolves.     1. Dry eye syndrome of both eyes  - artificial tears, one drop to both eyes, 4 times a day  - artificial tear ointment, a small amount into both eyes, twice a day       Outpatient Follow-up: Patient should follow-up with his/her ophthalmologist or with [facility] Department of Ophthalmology within 1 week of after discharge at:    [address]  [place] [address]  [phone]    Bruce Schmitz MD, PGY-2  Pager: [phone]  Also available on [employer] Teams

## 2021-06-25 NOTE — ED ADULT NURSE REASSESSMENT NOTE - NS ED NURSE REASSESS COMMENT FT1
patient returned from opthalmology. Patient states she had right eye pain x3days getting worse. She had a dilated eye exam earlier in the night. States her  is coming to pick her up. She is a&ox3 in no distress. Vitals stable. Discussed discharge instructions with patient.

## 2021-07-15 ENCOUNTER — APPOINTMENT (OUTPATIENT)
Dept: OPHTHALMOLOGY | Facility: CLINIC | Age: 46
End: 2021-07-15

## 2021-08-17 ENCOUNTER — NON-APPOINTMENT (OUTPATIENT)
Age: 46
End: 2021-08-17

## 2021-08-17 ENCOUNTER — APPOINTMENT (OUTPATIENT)
Dept: OPHTHALMOLOGY | Facility: CLINIC | Age: 46
End: 2021-08-17
Payer: COMMERCIAL

## 2021-08-17 PROCEDURE — 92002 INTRM OPH EXAM NEW PATIENT: CPT

## 2021-10-05 ENCOUNTER — NON-APPOINTMENT (OUTPATIENT)
Age: 46
End: 2021-10-05

## 2021-10-05 ENCOUNTER — APPOINTMENT (OUTPATIENT)
Dept: OPHTHALMOLOGY | Facility: CLINIC | Age: 46
End: 2021-10-05
Payer: COMMERCIAL

## 2021-10-05 PROCEDURE — 92014 COMPRE OPH EXAM EST PT 1/>: CPT

## 2021-10-05 PROCEDURE — 92136 OPHTHALMIC BIOMETRY: CPT

## 2021-10-19 ENCOUNTER — APPOINTMENT (OUTPATIENT)
Dept: OPHTHALMOLOGY | Facility: CLINIC | Age: 46
End: 2021-10-19
Payer: COMMERCIAL

## 2021-10-19 ENCOUNTER — NON-APPOINTMENT (OUTPATIENT)
Age: 46
End: 2021-10-19

## 2021-10-19 PROCEDURE — 92012 INTRM OPH EXAM EST PATIENT: CPT

## 2021-10-19 PROCEDURE — 92025 CPTRIZED CORNEAL TOPOGRAPHY: CPT

## 2021-10-19 PROCEDURE — 92136 OPHTHALMIC BIOMETRY: CPT

## 2021-12-27 ENCOUNTER — APPOINTMENT (OUTPATIENT)
Dept: OPHTHALMOLOGY | Facility: AMBULATORY SURGERY CENTER | Age: 46
End: 2021-12-27

## 2021-12-28 ENCOUNTER — APPOINTMENT (OUTPATIENT)
Dept: OPHTHALMOLOGY | Facility: CLINIC | Age: 46
End: 2021-12-28

## 2022-01-04 ENCOUNTER — APPOINTMENT (OUTPATIENT)
Dept: OPHTHALMOLOGY | Facility: CLINIC | Age: 47
End: 2022-01-04

## 2022-01-25 ENCOUNTER — APPOINTMENT (OUTPATIENT)
Dept: OPHTHALMOLOGY | Facility: CLINIC | Age: 47
End: 2022-01-25

## 2022-02-24 ENCOUNTER — EMERGENCY (EMERGENCY)
Facility: HOSPITAL | Age: 47
LOS: 1 days | Discharge: HOME | End: 2022-02-24
Attending: EMERGENCY MEDICINE
Payer: COMMERCIAL

## 2022-02-24 VITALS
TEMPERATURE: 99 F | RESPIRATION RATE: 18 BRPM | OXYGEN SATURATION: 98 % | WEIGHT: 171.96 LBS | HEART RATE: 100 BPM | DIASTOLIC BLOOD PRESSURE: 80 MMHG | HEIGHT: 62 IN | SYSTOLIC BLOOD PRESSURE: 156 MMHG

## 2022-02-24 PROCEDURE — 73552 X-RAY EXAM OF FEMUR 2/>: CPT | Mod: 26,LT

## 2022-02-24 PROCEDURE — 73502 X-RAY EXAM HIP UNI 2-3 VIEWS: CPT

## 2022-02-24 PROCEDURE — 73552 X-RAY EXAM OF FEMUR 2/>: CPT

## 2022-02-24 PROCEDURE — 73502 X-RAY EXAM HIP UNI 2-3 VIEWS: CPT | Mod: 26,LT

## 2022-02-24 PROCEDURE — 99284 EMERGENCY DEPT VISIT MOD MDM: CPT | Mod: 25

## 2022-02-24 PROCEDURE — 99284 EMERGENCY DEPT VISIT MOD MDM: CPT

## 2022-02-24 RX ORDER — IBUPROFEN 200 MG
600 TABLET ORAL ONCE
Refills: 0 | Status: COMPLETED | OUTPATIENT
Start: 2022-02-24 | End: 2022-02-24

## 2022-02-24 RX ORDER — LIDOCAINE 4 G/100G
1 CREAM TOPICAL ONCE
Refills: 0 | Status: COMPLETED | OUTPATIENT
Start: 2022-02-24 | End: 2022-02-24

## 2022-02-24 RX ADMIN — Medication 600 MILLIGRAM(S): at 13:38

## 2022-02-24 RX ADMIN — LIDOCAINE 1 PATCH: 4 CREAM TOPICAL at 13:32

## 2022-02-24 RX ADMIN — Medication 600 MILLIGRAM(S): at 13:09

## 2022-02-24 NOTE — ED PROVIDER NOTE - NSFOLLOWUPCLINICS_GEN_ALL_ED_FT
[facility] Sports Medicine  Sports Medicine  [address] [place] [address]  Phone: [phone]  Fax:     [facility]  Orthopedic Surgery  [address] [address]  Phone: [phone]  Fax:

## 2022-02-24 NOTE — ED PROVIDER NOTE - NSICDXPASTSURGICALHX_GEN_ALL_CORE_FT
PAST SURGICAL HISTORY:  C section 2008    No significant past surgical history     S/P laparoscopic procedure D&C Hysteroscopy, Laparoscopy, RSO/ Excision Right Tubo-ovarian abscess 7/19

## 2022-02-24 NOTE — ED PROVIDER NOTE - PROGRESS NOTE DETAILS
Nikita Stevenson PA-C: NAF on xray. pain improved. pt in and out of bed to bathroom on other side of unit w/o difficulty. With give ortho/sports medicine f/u. Nikita Stevenson PA-C: NAF on xray, results reviewed w/ pt. pain improved. pt in and out of bed to bathroom on other side of unit w/o difficulty. With give ortho/sports medicine f/u.

## 2022-02-24 NOTE — ED PROVIDER NOTE - OBJECTIVE STATEMENT
46y F pmhx IDDM, presents to ED c/o atraumatic L lateral thigh pain x 6 days. Minimal pain initially, increasing and becoming more consistent over last couple of days. Able to range hip and knee. Ambulatory with some discomfort. No edema or hx of blood clots. Denies f/c, abd pain, nvd, urinary complaints. 46y F pmhx IDDM, presents to ED c/o atraumatic L hip and lateral thigh pain x 6 days. Minimal pain initially, increasing and becoming more consistent over last couple of days. Non-radiating. Able to range hip and knee. Ambulatory with some discomfort. No edema or hx of blood clots. Denies f/c, abd pain, nvd, urinary complaints, back pain. 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

## 2022-02-24 NOTE — ED PROVIDER NOTE - CLINICAL SUMMARY MEDICAL DECISION MAKING FREE TEXT BOX
Attending MD Ojeda:  46F with left leg hip pain, no trauma. Examination reveals mild lateral hip ttp, full PROM of left hip, no NV compromise. Ddx includes bursitis, OA, msk strain. I do not suspect septic hip or fx. Plan for screening XR, analgesia, reassess      *The above represents an initial assessment/impression. Please refer to progress notes for potential changes in patient clinical course*

## 2022-02-24 NOTE — ED ADULT NURSE NOTE - OBJECTIVE STATEMENT
c/o atraumatic L lateral thigh pain x 6 days. Minimal pain initially, increasing and becoming more consistent over last couple of days. Pt ambulatory. No distress. Breathing easy and non labored. Pt calm and cooperative at this time. Emotional support offered.

## 2022-02-24 NOTE — ED PROVIDER NOTE - NSFOLLOWUPINSTRUCTIONS_ED_ALL_ED_FT
1) Follow-up with your primary care provider in 1-2 days.      Follow-up with Sports Medicine/Orthopedics within 1-2 weeks. Contact info provided.     2) Continue to take all medications as prescribed.    3) Rest and drink plenty of fluids. Pain can be managed with Acetaminophen (aka Tylenol) and Ibuprofen (aka Motrin or Advil) over the counter as directed. Take with food.    4) Return to the ER for any new or worsening symptoms.

## 2022-02-24 NOTE — ED PROVIDER NOTE - PATIENT PORTAL LINK FT
You can access the FollowMyHealth Patient Portal offered by [facility] by registering at the following website: [email or web address]. By joining [employer]’s FollowMyHealth portal, you will also be able to view your health information using other applications (apps) compatible with our system.

## 2022-02-24 NOTE — ED PROVIDER NOTE - ATTENDING CONTRIBUTION TO CARE
Attending MD Ojeda:   I personally have seen and examined this patient. I have performed a substantive portion of the visit including all aspects of the medical decision making.   Physician assistant note reviewed and agree on plan of care and except where noted.  See HPI, PE, and MDM for details.       46F with left leg hip pain, no trauma. Examination reveals mild lateral hip ttp, full PROM of left hip, no NV compromise. Ddx includes bursitis, OA, msk strain. I do not suspect septic hip or fx. Plan for screening XR, analgesia, reassess

## 2022-02-24 NOTE — ED PROVIDER NOTE - PHYSICAL EXAMINATION
GEN: Pt non-toxic in NAD, A&Ox3.  PSYCH: Affect and mood appropriate.  EYES: Sclera white w/o injection.  ENT: Neck supple FROM. Airway patent.  RESP: CTA b/l, no wheezes, rales, or rhonchi.   CARDIAC: RRR, clear distinct S1, S2, no appreciable murmurs.  ABD: Abdomen soft, non-tender. No CVAT b/l.  MSK: No joint erythema or obvious deformity. FPROM b/l LE w/o pain.    NEURO: No focal motor or sensory deficits. Strength 5/5 b/l LE. Ambulatory.  VASC: Dorsalis pedis pulses 2+ b/l. No edema or calf tenderness.  SKIN: No rashes or lesions. GEN: Pt non-toxic in NAD, A&Ox3.  PSYCH: Affect and mood appropriate.  EYES: Sclera white w/o injection.  ENT: Neck supple FROM. Airway patent.  RESP: CTA b/l, no wheezes, rales, or rhonchi.   CARDIAC: RRR, clear distinct S1, S2, no appreciable murmurs.  ABD: Abdomen soft, non-tender. No CVAT b/l.  MSK: No joint erythema or obvious deformity. Very mild lateral L hip ttp. FPROM b/l LE w/o pain.    NEURO: No focal motor or sensory deficits. Strength 5/5 b/l LE. Ambulatory.  VASC: Dorsalis pedis pulses 2+ b/l. No edema or calf tenderness.  SKIN: No rashes or lesions.

## 2022-04-01 NOTE — ED PROVIDER NOTE - CROS ED CARDIOVAS ALL NEG
33y/o female A&Ox3 speaking coherently independent 30wks pregnant presents with cold like symptoms. Endorses ear pain, chest/nasal congestion, productive cough (mucus is green in color). Went to  on Tuesday, tests came back negative for covid flu & strep. Denies fevers. Has 4yr old at home who is also sick. Does not appear to be in any acute distress. Neuro intact. State they called OBGYN who told them to come here. Proper precautions in place. All needs met,  at bedside. Safety and comfort measures provided. Bed locked and in lowest position, side rails up for safety. Call bell within reach. negative...

## 2022-12-08 NOTE — ED PROVIDER NOTE - PSYCHIATRIC NEGATIVE STATEMENT, MLM
What Type Of Note Output Would You Prefer (Optional)?: Bullet Format Is This A New Presentation, Or A Follow-Up?: Skin Lesion no known mental health issues.

## 2023-02-08 NOTE — H&P ADULT - NSHPPOACENTRALVENOUSCATHETER_GEN_ALL_CORE
1059:  Pt from OR awake and talking with staff.  Report received.  Pt denies pain at this time.    1110: Report to Shanon BELTRAN [facility].  Pt taking po fluids denies nausea at this time.    1128:  pt changed to cloth gown and positioned for comfort.  Reoriented to what was found during procedure.    1131:  Pt meets stage 2 criteria.  Transport alerted.    1135:  Pt via transport to room [facility]          
no

## 2023-02-27 ENCOUNTER — APPOINTMENT (OUTPATIENT)
Dept: ENDOCRINOLOGY | Facility: CLINIC | Age: 48
End: 2023-02-27

## 2023-05-31 NOTE — ED ADULT NURSE NOTE - FAMILY HISTORY
Office History and physical    Chief complaint:  Bilateral leg pain    History:    Helen Tsang is a 80 year old female who presents for evaluation of bilateral leg pain worse in the left leg than the right.  She reports that she had a varicose vein procedure in the remote past.  She has been wearing below-knee compression stockings but not everyday.  She recently obtained to new pair.  She denies any history of phlebitis.  She is had no cellulitis or leg ulceration.  Recent arterial duplex scan study of both lower extremities demonstrates normal arterial flow.  Her lipid panel is normal.  I am asked to see Mrs. Tsang by Dr. Baird regarding her varicose veins and leg pain.      aLLERGIES:    ALLERGIES:  Not on File    mEDICATIONS:    No current outpatient medications on file.     No current facility-administered medications for this visit.       MEDICAL ILLNESSES:  History reviewed. No pertinent past medical history.    SURGICAL HISTORY:  History reviewed. No pertinent surgical history.    HABITS:  Non smoker and nondrinker    SOCIAL HISTORY:  Retired    FAMILY HISTORY:  No history of phlebitis      Review of systems:    Constitutional:  Patient denies fever, chills, tiredness or malaise.    Eyes:  Denies change in visual acuity, pain, burning or itching.    Immunologic:  Denies hives, seasonal allergies.    HENT:  Denies sinus problems, ear infections, nasal congestion or sore throat.    Respiratory:  Denies cough, shortness of breath.    Cardiovascular:  Denies chest pain, edema.    GI:  Denies abdominal pain, nausea, vomiting, bloody stools or diarrhea.    :  Denies urine retention, painful urination, urinary frequency, blood in urine or nocturia.    Musculoskeletal:  Denies back pain, neck pain, joint pain or leg swelling.    Integument:  Reports severe varicosities on both calves and feet extending up into the medial thigh in the left extremity.  Reports chronic calf and pedal edema worse in the  right leg.    Neurologic:  Denies headache, focal weakness or sensory changes.    Endocrine:  Denies polyuria, polydipsia or temperature intolerance.    Lymphatic:  Denies swollen glands, weight loss.    Psychiatric:  Denies anxiety, depression, hallucinations, irritability or sleeping problems.    Physical Exam:    Vital Signs:  There were no vitals taken for this visit.  Constitutional:  Morbidly obese female.  No acute distress  Integument:  Warm. Dry. No erythema. No rash.    HENT:  Normocephalic. Atraumatic. Bilateral external ears normal.   Neck: Normal range of motion. No tenderness. Supple. No stridor.    Cardiovascular:  Normal heart rate. Normal rhythm. No murmurs. No rubs. No gallops.    Extremities:  Intact pedal pulses.  Trace pedal edema bilaterally.  Diffuse ropy varicosities covering both calves both medially and laterally.  These extend into the para malleolar areas in both lower extremities.  There is no skin breakdown or erythema.  There is some ropy varicosities in the left medial thigh.  Respiratory:  Normal breath sounds. No respiratory distress. No wheezing. No chest tenderness.    Breast:  Not evaluated   Abdomen:  Obese soft nontender  :  No flank or suprapubic tenderness  Neurologic:  Alert & oriented x 3. Normal motor function. Normal sensory function. No   focal deficits noted.        Assessment:  Symptomatic diffuse varicosities.  Normal arterial flow.  Patient has been wearing below-knee compression stockings.  She should continue doing this.    PLAN:  Nocturnal leg elevation.  Compression stocking therapy.  Venous insufficiency study bilaterally.  The patient can call the office for imaging results.  This will determine if any procedural intervention is indicated.   No pertinent family history in first degree relatives

## 2025-04-24 ENCOUNTER — LABORATORY RESULT (OUTPATIENT)
Age: 50
End: 2025-04-24

## 2025-04-24 ENCOUNTER — APPOINTMENT (OUTPATIENT)
Dept: INTERNAL MEDICINE | Facility: CLINIC | Age: 50
End: 2025-04-24

## 2025-04-24 ENCOUNTER — NON-APPOINTMENT (OUTPATIENT)
Age: 50
End: 2025-04-24

## 2025-04-24 VITALS
OXYGEN SATURATION: 99 % | RESPIRATION RATE: 16 BRPM | HEART RATE: 91 BPM | TEMPERATURE: 97.8 F | HEIGHT: 65 IN | WEIGHT: 192 LBS | BODY MASS INDEX: 31.99 KG/M2 | DIASTOLIC BLOOD PRESSURE: 76 MMHG | SYSTOLIC BLOOD PRESSURE: 174 MMHG

## 2025-04-24 DIAGNOSIS — M25.559 PAIN IN UNSPECIFIED HIP: ICD-10-CM

## 2025-04-24 DIAGNOSIS — R31.29 OTHER MICROSCOPIC HEMATURIA: ICD-10-CM

## 2025-04-24 DIAGNOSIS — D64.9 ANEMIA, UNSPECIFIED: ICD-10-CM

## 2025-04-24 DIAGNOSIS — Z00.00 ENCOUNTER FOR GENERAL ADULT MEDICAL EXAMINATION W/OUT ABNORMAL FINDINGS: ICD-10-CM

## 2025-04-24 DIAGNOSIS — I10 ESSENTIAL (PRIMARY) HYPERTENSION: ICD-10-CM

## 2025-04-24 DIAGNOSIS — E11.9 TYPE 2 DIABETES MELLITUS W/OUT COMPLICATIONS: ICD-10-CM

## 2025-04-24 DIAGNOSIS — G89.29 PAIN IN UNSPECIFIED HIP: ICD-10-CM

## 2025-04-24 PROCEDURE — 99204 OFFICE O/P NEW MOD 45 MIN: CPT | Mod: 25

## 2025-04-24 PROCEDURE — 93000 ELECTROCARDIOGRAM COMPLETE: CPT

## 2025-04-24 RX ORDER — LOSARTAN POTASSIUM 50 MG/1
50 TABLET, FILM COATED ORAL
Qty: 90 | Refills: 1 | Status: ACTIVE | COMMUNITY
Start: 2025-04-24 | End: 1900-01-01

## 2025-04-24 RX ORDER — DAPAGLIFLOZIN 10 MG/1
10 TABLET, FILM COATED ORAL DAILY
Qty: 90 | Refills: 0 | Status: ACTIVE | COMMUNITY
Start: 2025-04-24 | End: 1900-01-01

## 2025-04-25 PROBLEM — R31.29 MICROSCOPIC HEMATURIA: Status: ACTIVE | Noted: 2025-04-25

## 2025-04-25 LAB
ALBUMIN SERPL ELPH-MCNC: 4 G/DL
ALP BLD-CCNC: 86 U/L
ALT SERPL-CCNC: 12 U/L
ANION GAP SERPL CALC-SCNC: 12 MMOL/L
APPEARANCE: CLEAR
AST SERPL-CCNC: 12 U/L
BASOPHILS # BLD AUTO: 0.03 K/UL
BASOPHILS NFR BLD AUTO: 0.6 %
BILIRUB DIRECT SERPL-MCNC: 0.1 MG/DL
BILIRUB INDIRECT SERPL-MCNC: 0.2 MG/DL
BILIRUB SERPL-MCNC: 0.4 MG/DL
BILIRUBIN URINE: NEGATIVE
BLOOD URINE: ABNORMAL
BUN SERPL-MCNC: 20 MG/DL
CALCIUM SERPL-MCNC: 9.6 MG/DL
CHLORIDE SERPL-SCNC: 102 MMOL/L
CHOLEST SERPL-MCNC: 224 MG/DL
CO2 SERPL-SCNC: 24 MMOL/L
COLOR: YELLOW
CREAT SERPL-MCNC: 0.7 MG/DL
CREAT SPEC-SCNC: 109 MG/DL
CREAT/PROT UR: 1 RATIO
EGFRCR SERPLBLD CKD-EPI 2021: 105 ML/MIN/1.73M2
EOSINOPHIL # BLD AUTO: 0.13 K/UL
EOSINOPHIL NFR BLD AUTO: 2.4 %
ESTIMATED AVERAGE GLUCOSE: 252 MG/DL
FERRITIN SERPL-MCNC: 155 NG/ML
GLUCOSE QUALITATIVE U: 250 MG/DL
GLUCOSE SERPL-MCNC: 227 MG/DL
HBA1C MFR BLD HPLC: 10.4 %
HCT VFR BLD CALC: 32.3 %
HDLC SERPL-MCNC: 79 MG/DL
HGB BLD-MCNC: 10 G/DL
IMM GRANULOCYTES NFR BLD AUTO: 0.4 %
IRON SATN MFR SERPL: 14 %
IRON SERPL-MCNC: 38 UG/DL
KETONES URINE: NEGATIVE MG/DL
LDLC SERPL-MCNC: 129 MG/DL
LEUKOCYTE ESTERASE URINE: NEGATIVE
LYMPHOCYTES # BLD AUTO: 1.88 K/UL
LYMPHOCYTES NFR BLD AUTO: 35.1 %
MAGNESIUM SERPL-MCNC: 1.8 MG/DL
MAN DIFF?: NORMAL
MCHC RBC-ENTMCNC: 26.2 PG
MCHC RBC-ENTMCNC: 31 G/DL
MCV RBC AUTO: 84.6 FL
MONOCYTES # BLD AUTO: 0.47 K/UL
MONOCYTES NFR BLD AUTO: 8.8 %
NEUTROPHILS # BLD AUTO: 2.83 K/UL
NEUTROPHILS NFR BLD AUTO: 52.7 %
NITRITE URINE: NEGATIVE
NONHDLC SERPL-MCNC: 145 MG/DL
PH URINE: 6.5
PLATELET # BLD AUTO: 294 K/UL
POTASSIUM SERPL-SCNC: 4.2 MMOL/L
PROT SERPL-MCNC: 7.2 G/DL
PROT UR-MCNC: 104 MG/DL
PROTEIN URINE: 100 MG/DL
RBC # BLD: 3.82 M/UL
RBC # FLD: 14.6 %
SODIUM SERPL-SCNC: 138 MMOL/L
SPECIFIC GRAVITY URINE: 1.02
T4 FREE SERPL-MCNC: 1.4 NG/DL
TIBC SERPL-MCNC: 274 UG/DL
TRIGL SERPL-MCNC: 89 MG/DL
TSH SERPL-ACNC: 1.71 UIU/ML
UIBC SERPL-MCNC: 236 UG/DL
UROBILINOGEN URINE: 1 MG/DL
WBC # FLD AUTO: 5.36 K/UL

## 2025-04-29 ENCOUNTER — APPOINTMENT (OUTPATIENT)
Dept: ORTHOPEDIC SURGERY | Facility: CLINIC | Age: 50
End: 2025-04-29
Payer: MEDICAID

## 2025-04-29 VITALS — WEIGHT: 194 LBS | HEIGHT: 62 IN | BODY MASS INDEX: 35.7 KG/M2

## 2025-04-29 DIAGNOSIS — M25.552 PAIN IN LEFT HIP: ICD-10-CM

## 2025-04-29 DIAGNOSIS — M25.852 OTHER SPECIFIED JOINT DISORDERS, LEFT HIP: ICD-10-CM

## 2025-04-29 DIAGNOSIS — G89.29 PAIN IN LEFT HIP: ICD-10-CM

## 2025-04-29 PROCEDURE — 73502 X-RAY EXAM HIP UNI 2-3 VIEWS: CPT

## 2025-04-29 PROCEDURE — 99203 OFFICE O/P NEW LOW 30 MIN: CPT | Mod: 25

## 2025-05-08 ENCOUNTER — APPOINTMENT (OUTPATIENT)
Dept: INTERNAL MEDICINE | Facility: CLINIC | Age: 50
End: 2025-05-08

## 2025-05-08 VITALS
BODY MASS INDEX: 34.41 KG/M2 | HEIGHT: 62 IN | WEIGHT: 187 LBS | SYSTOLIC BLOOD PRESSURE: 124 MMHG | DIASTOLIC BLOOD PRESSURE: 70 MMHG | OXYGEN SATURATION: 99 % | HEART RATE: 87 BPM | TEMPERATURE: 97.7 F

## 2025-05-08 DIAGNOSIS — I10 ESSENTIAL (PRIMARY) HYPERTENSION: ICD-10-CM

## 2025-05-08 DIAGNOSIS — E11.9 TYPE 2 DIABETES MELLITUS W/OUT COMPLICATIONS: ICD-10-CM

## 2025-05-08 PROCEDURE — 99214 OFFICE O/P EST MOD 30 MIN: CPT

## 2025-05-08 PROCEDURE — G2211 COMPLEX E/M VISIT ADD ON: CPT | Mod: NC

## 2025-05-09 LAB
APPEARANCE: CLEAR
BACTERIA: ABNORMAL /HPF
BILIRUBIN URINE: NEGATIVE
BLOOD URINE: NEGATIVE
CAST: 7 /LPF
COLOR: YELLOW
EPITHELIAL CELLS: 12 /HPF
GLUCOSE QUALITATIVE U: >=1000 MG/DL
HYALINE CASTS: PRESENT
KETONES URINE: NEGATIVE MG/DL
LEUKOCYTE ESTERASE URINE: NEGATIVE
MICROSCOPIC-UA: NORMAL
NITRITE URINE: NEGATIVE
PH URINE: 6
PROTEIN URINE: 30 MG/DL
RED BLOOD CELLS URINE: 1 /HPF
REVIEW: NORMAL
SPECIFIC GRAVITY URINE: 1.03
UROBILINOGEN URINE: 0.2 MG/DL
WHITE BLOOD CELLS URINE: 2 /HPF

## 2025-05-13 LAB — BACTERIA UR CULT: NORMAL

## 2025-05-22 ENCOUNTER — TRANSCRIPTION ENCOUNTER (OUTPATIENT)
Age: 50
End: 2025-05-22

## 2025-06-30 NOTE — H&P ADULT - ATTENDING COMMENTS
screening
42F presented to ED (off the plane from [place]) with severe DKA, high anion gap metabolic acidosis and ketoacidosis, severe dehydration in the setting of noncompliance with her insulin regimen. R/O sepsis pending PCT level and C & S.   - ICU admission   - Aggressive hydration and Insulin gtt protocol  - L'te supplement and follow up Q4-6 hr.     (Critical Care Time = 45 min)

## 2025-07-01 ENCOUNTER — APPOINTMENT (OUTPATIENT)
Dept: ORTHOPEDIC SURGERY | Facility: CLINIC | Age: 50
End: 2025-07-01
Payer: MEDICAID

## 2025-07-01 ENCOUNTER — APPOINTMENT (OUTPATIENT)
Dept: ORTHOPEDIC SURGERY | Facility: CLINIC | Age: 50
End: 2025-07-01

## 2025-07-01 VITALS — WEIGHT: 184 LBS | HEIGHT: 62 IN | BODY MASS INDEX: 33.86 KG/M2

## 2025-07-01 PROCEDURE — 99213 OFFICE O/P EST LOW 20 MIN: CPT

## 2025-07-08 ENCOUNTER — APPOINTMENT (OUTPATIENT)
Dept: INTERNAL MEDICINE | Facility: CLINIC | Age: 50
End: 2025-07-08
Payer: MEDICAID

## 2025-07-08 VITALS
SYSTOLIC BLOOD PRESSURE: 120 MMHG | DIASTOLIC BLOOD PRESSURE: 80 MMHG | OXYGEN SATURATION: 98 % | HEIGHT: 62 IN | WEIGHT: 198 LBS | TEMPERATURE: 98.1 F | BODY MASS INDEX: 36.44 KG/M2 | HEART RATE: 79 BPM

## 2025-07-08 PROCEDURE — G2211 COMPLEX E/M VISIT ADD ON: CPT | Mod: NC

## 2025-07-08 PROCEDURE — 99214 OFFICE O/P EST MOD 30 MIN: CPT

## 2025-07-09 PROBLEM — E87.6 HYPOKALEMIA: Status: ACTIVE | Noted: 2025-07-09

## 2025-07-09 LAB
25(OH)D3 SERPL-MCNC: 17.9 NG/ML
ALBUMIN SERPL ELPH-MCNC: 3.8 G/DL
ALP BLD-CCNC: 85 U/L
ALT SERPL-CCNC: 17 U/L
ANION GAP SERPL CALC-SCNC: 14 MMOL/L
AST SERPL-CCNC: 18 U/L
BASOPHILS # BLD AUTO: 0.02 K/UL
BASOPHILS NFR BLD AUTO: 0.3 %
BILIRUB DIRECT SERPL-MCNC: 0.09 MG/DL
BILIRUB INDIRECT SERPL-MCNC: 0.1 MG/DL
BILIRUB SERPL-MCNC: 0.2 MG/DL
BUN SERPL-MCNC: 16 MG/DL
CALCIUM SERPL-MCNC: 9.5 MG/DL
CHLORIDE SERPL-SCNC: 108 MMOL/L
CHOLEST SERPL-MCNC: 196 MG/DL
CO2 SERPL-SCNC: 22 MMOL/L
CREAT SERPL-MCNC: 0.75 MG/DL
EGFRCR SERPLBLD CKD-EPI 2021: 96 ML/MIN/1.73M2
EOSINOPHIL # BLD AUTO: 0.1 K/UL
EOSINOPHIL NFR BLD AUTO: 1.5 %
ESTIMATED AVERAGE GLUCOSE: 232 MG/DL
FERRITIN SERPL-MCNC: 153 NG/ML
GLUCOSE SERPL-MCNC: 39 MG/DL
HBA1C MFR BLD HPLC: 9.7 %
HCT VFR BLD CALC: 30.6 %
HDLC SERPL-MCNC: 75 MG/DL
HGB A MFR BLD: 97.5 %
HGB A2 MFR BLD: 2.5 %
HGB BLD-MCNC: 9.2 G/DL
HGB FRACT BLD-IMP: NORMAL
IMM GRANULOCYTES NFR BLD AUTO: 0.3 %
IRON SATN MFR SERPL: 19 %
IRON SERPL-MCNC: 49 UG/DL
LDLC SERPL-MCNC: 111 MG/DL
LYMPHOCYTES # BLD AUTO: 3.27 K/UL
LYMPHOCYTES NFR BLD AUTO: 50.4 %
MAGNESIUM SERPL-MCNC: 1.8 MG/DL
MAN DIFF?: NORMAL
MCHC RBC-ENTMCNC: 26.6 PG
MCHC RBC-ENTMCNC: 30.1 G/DL
MCV RBC AUTO: 88.4 FL
MONOCYTES # BLD AUTO: 0.57 K/UL
MONOCYTES NFR BLD AUTO: 8.8 %
NEUTROPHILS # BLD AUTO: 2.51 K/UL
NEUTROPHILS NFR BLD AUTO: 38.7 %
NONHDLC SERPL-MCNC: 121 MG/DL
PLATELET # BLD AUTO: 273 K/UL
POTASSIUM SERPL-SCNC: 3.4 MMOL/L
PROT SERPL-MCNC: 6.8 G/DL
RBC # BLD: 3.46 M/UL
RBC # FLD: 14.7 %
SODIUM SERPL-SCNC: 144 MMOL/L
T4 FREE SERPL-MCNC: 1.2 NG/DL
TIBC SERPL-MCNC: 260 UG/DL
TRIGL SERPL-MCNC: 54 MG/DL
TSH SERPL-ACNC: 1.84 UIU/ML
UIBC SERPL-MCNC: 211 UG/DL
VIT B12 SERPL-MCNC: 904 PG/ML
WBC # FLD AUTO: 6.49 K/UL

## 2025-07-09 RX ORDER — POTASSIUM CHLORIDE 1.5 G/1
20 POWDER, FOR SOLUTION ORAL
Qty: 3 | Refills: 0 | Status: ACTIVE | COMMUNITY
Start: 2025-07-09 | End: 1900-01-01

## 2025-07-10 ENCOUNTER — OUTPATIENT (OUTPATIENT)
Dept: OUTPATIENT SERVICES | Facility: HOSPITAL | Age: 50
LOS: 1 days | End: 2025-07-10
Payer: MEDICAID

## 2025-07-10 ENCOUNTER — APPOINTMENT (OUTPATIENT)
Dept: MRI IMAGING | Facility: IMAGING CENTER | Age: 50
End: 2025-07-10

## 2025-07-10 DIAGNOSIS — M25.852 OTHER SPECIFIED JOINT DISORDERS, LEFT HIP: ICD-10-CM

## 2025-07-10 PROCEDURE — 73721 MRI JNT OF LWR EXTRE W/O DYE: CPT | Mod: 26,LT

## 2025-07-10 PROCEDURE — 73721 MRI JNT OF LWR EXTRE W/O DYE: CPT

## 2025-07-15 ENCOUNTER — APPOINTMENT (OUTPATIENT)
Dept: INTERNAL MEDICINE | Facility: CLINIC | Age: 50
End: 2025-07-15

## 2025-07-18 ENCOUNTER — APPOINTMENT (OUTPATIENT)
Dept: INTERNAL MEDICINE | Facility: CLINIC | Age: 50
End: 2025-07-18
Payer: MEDICAID

## 2025-07-18 ENCOUNTER — LABORATORY RESULT (OUTPATIENT)
Age: 50
End: 2025-07-18

## 2025-07-18 VITALS
HEIGHT: 62 IN | HEART RATE: 79 BPM | WEIGHT: 195 LBS | OXYGEN SATURATION: 98 % | DIASTOLIC BLOOD PRESSURE: 70 MMHG | BODY MASS INDEX: 35.88 KG/M2 | SYSTOLIC BLOOD PRESSURE: 120 MMHG

## 2025-07-18 PROCEDURE — G2211 COMPLEX E/M VISIT ADD ON: CPT | Mod: NC

## 2025-07-18 PROCEDURE — 99214 OFFICE O/P EST MOD 30 MIN: CPT

## 2025-07-18 RX ORDER — FERROUS SULFATE 324(65)MG
324 (65 FE) TABLET, DELAYED RELEASE (ENTERIC COATED) ORAL
Qty: 45 | Refills: 0 | Status: ACTIVE | COMMUNITY
Start: 2025-07-18 | End: 1900-01-01

## 2025-07-19 ENCOUNTER — EMERGENCY (EMERGENCY)
Facility: HOSPITAL | Age: 50
LOS: 1 days | End: 2025-07-19
Attending: EMERGENCY MEDICINE
Payer: MEDICAID

## 2025-07-19 VITALS
TEMPERATURE: 98 F | OXYGEN SATURATION: 97 % | WEIGHT: 194.01 LBS | DIASTOLIC BLOOD PRESSURE: 79 MMHG | HEART RATE: 84 BPM | RESPIRATION RATE: 19 BRPM | SYSTOLIC BLOOD PRESSURE: 174 MMHG | HEIGHT: 62 IN

## 2025-07-19 VITALS
RESPIRATION RATE: 18 BRPM | DIASTOLIC BLOOD PRESSURE: 94 MMHG | HEART RATE: 79 BPM | OXYGEN SATURATION: 99 % | SYSTOLIC BLOOD PRESSURE: 190 MMHG | TEMPERATURE: 99 F

## 2025-07-19 LAB
ALBUMIN SERPL ELPH-MCNC: 3.4 G/DL
ALBUMIN SERPL ELPH-MCNC: 3.9 G/DL — SIGNIFICANT CHANGE UP (ref 3.3–5)
ALP BLD-CCNC: 113 U/L
ALP SERPL-CCNC: 85 U/L — SIGNIFICANT CHANGE UP (ref 40–120)
ALT FLD-CCNC: 18 U/L — SIGNIFICANT CHANGE UP (ref 10–45)
ALT SERPL-CCNC: 23 U/L
ANION GAP SERPL CALC-SCNC: 10 MMOL/L — SIGNIFICANT CHANGE UP (ref 5–17)
ANION GAP SERPL CALC-SCNC: 11 MMOL/L — SIGNIFICANT CHANGE UP (ref 5–17)
ANION GAP SERPL CALC-SCNC: 17 MMOL/L
AST SERPL-CCNC: 28 U/L
AST SERPL-CCNC: 37 U/L — SIGNIFICANT CHANGE UP (ref 10–40)
BASOPHILS # BLD AUTO: 0 K/UL
BASOPHILS NFR BLD AUTO: 0 %
BILIRUB SERPL-MCNC: 0.3 MG/DL — SIGNIFICANT CHANGE UP (ref 0.2–1.2)
BILIRUB SERPL-MCNC: 0.8 MG/DL
BUN SERPL-MCNC: 20 MG/DL — SIGNIFICANT CHANGE UP (ref 7–23)
BUN SERPL-MCNC: 20 MG/DL — SIGNIFICANT CHANGE UP (ref 7–23)
BUN SERPL-MCNC: 78 MG/DL
CALCIUM SERPL-MCNC: 10 MG/DL — SIGNIFICANT CHANGE UP (ref 8.4–10.5)
CALCIUM SERPL-MCNC: 9.5 MG/DL
CALCIUM SERPL-MCNC: 9.5 MG/DL — SIGNIFICANT CHANGE UP (ref 8.4–10.5)
CHLORIDE SERPL-SCNC: 101 MMOL/L
CHLORIDE SERPL-SCNC: 105 MMOL/L — SIGNIFICANT CHANGE UP (ref 96–108)
CHLORIDE SERPL-SCNC: 105 MMOL/L — SIGNIFICANT CHANGE UP (ref 96–108)
CO2 SERPL-SCNC: 18 MMOL/L
CO2 SERPL-SCNC: 22 MMOL/L — SIGNIFICANT CHANGE UP (ref 22–31)
CO2 SERPL-SCNC: 27 MMOL/L — SIGNIFICANT CHANGE UP (ref 22–31)
CREAT SERPL-MCNC: 0.69 MG/DL — SIGNIFICANT CHANGE UP (ref 0.5–1.3)
CREAT SERPL-MCNC: 0.81 MG/DL — SIGNIFICANT CHANGE UP (ref 0.5–1.3)
CREAT SERPL-MCNC: 3.15 MG/DL
EGFR: 105 ML/MIN/1.73M2 — SIGNIFICANT CHANGE UP
EGFR: 105 ML/MIN/1.73M2 — SIGNIFICANT CHANGE UP
EGFR: 88 ML/MIN/1.73M2 — SIGNIFICANT CHANGE UP
EGFR: 88 ML/MIN/1.73M2 — SIGNIFICANT CHANGE UP
EGFRCR SERPLBLD CKD-EPI 2021: 17 ML/MIN/1.73M2
EOSINOPHIL # BLD AUTO: 0.07 K/UL
EOSINOPHIL NFR BLD AUTO: 3.5 %
GAS PNL BLDV: SIGNIFICANT CHANGE UP
GLUCOSE SERPL-MCNC: 136 MG/DL — HIGH (ref 70–99)
GLUCOSE SERPL-MCNC: 201 MG/DL
GLUCOSE SERPL-MCNC: 89 MG/DL — SIGNIFICANT CHANGE UP (ref 70–99)
HCT VFR BLD CALC: 32.2 %
HCT VFR BLD CALC: 32.7 % — LOW (ref 34.5–45)
HGB BLD-MCNC: 10.1 G/DL — LOW (ref 11.5–15.5)
HGB BLD-MCNC: 9.8 G/DL
LYMPHOCYTES # BLD AUTO: 1.02 K/UL
LYMPHOCYTES NFR BLD AUTO: 53.1 %
MAGNESIUM SERPL-MCNC: 2.1 MG/DL
MAGNESIUM SERPL-MCNC: 2.1 MG/DL — SIGNIFICANT CHANGE UP (ref 1.6–2.6)
MAN DIFF?: NORMAL
MCHC RBC-ENTMCNC: 26 PG
MCHC RBC-ENTMCNC: 26.2 PG — LOW (ref 27–34)
MCHC RBC-ENTMCNC: 30.4 G/DL
MCHC RBC-ENTMCNC: 30.9 G/DL — LOW (ref 32–36)
MCV RBC AUTO: 84.9 FL — SIGNIFICANT CHANGE UP (ref 80–100)
MCV RBC AUTO: 85.4 FL
MONOCYTES # BLD AUTO: 0.02 K/UL
MONOCYTES NFR BLD AUTO: 0.9 %
NEUTROPHILS # BLD AUTO: 0.82 K/UL
NEUTROPHILS NFR BLD AUTO: 42.5 %
NRBC # BLD AUTO: 0 K/UL — SIGNIFICANT CHANGE UP (ref 0–0)
NRBC # FLD: 0 K/UL — SIGNIFICANT CHANGE UP (ref 0–0)
NRBC BLD AUTO-RTO: 0 /100 WBCS — SIGNIFICANT CHANGE UP (ref 0–0)
PLATELET # BLD AUTO: 235 K/UL
PLATELET # BLD AUTO: 256 K/UL — SIGNIFICANT CHANGE UP (ref 150–400)
PMV BLD: 10.6 FL — SIGNIFICANT CHANGE UP (ref 7–13)
POTASSIUM SERPL-MCNC: 3.7 MMOL/L — SIGNIFICANT CHANGE UP (ref 3.5–5.3)
POTASSIUM SERPL-MCNC: 7.6 MMOL/L — CRITICAL HIGH (ref 3.5–5.3)
POTASSIUM SERPL-SCNC: 3.7 MMOL/L — SIGNIFICANT CHANGE UP (ref 3.5–5.3)
POTASSIUM SERPL-SCNC: 4.1 MMOL/L
POTASSIUM SERPL-SCNC: 7.6 MMOL/L — CRITICAL HIGH (ref 3.5–5.3)
PROT SERPL-MCNC: 6.1 G/DL
PROT SERPL-MCNC: 7.7 G/DL — SIGNIFICANT CHANGE UP (ref 6–8.3)
RBC # BLD: 3.77 M/UL
RBC # BLD: 3.85 M/UL — SIGNIFICANT CHANGE UP (ref 3.8–5.2)
RBC # FLD: 14.1 % — SIGNIFICANT CHANGE UP (ref 10.3–14.5)
RBC # FLD: 14.7 %
SODIUM SERPL-SCNC: 137 MMOL/L
SODIUM SERPL-SCNC: 137 MMOL/L — SIGNIFICANT CHANGE UP (ref 135–145)
SODIUM SERPL-SCNC: 143 MMOL/L — SIGNIFICANT CHANGE UP (ref 135–145)
WBC # BLD: 6.21 K/UL — SIGNIFICANT CHANGE UP (ref 3.8–10.5)
WBC # FLD AUTO: 1.93 K/UL
WBC # FLD AUTO: 6.21 K/UL — SIGNIFICANT CHANGE UP (ref 3.8–10.5)

## 2025-07-19 PROCEDURE — 83605 ASSAY OF LACTIC ACID: CPT

## 2025-07-19 PROCEDURE — 85027 COMPLETE CBC AUTOMATED: CPT

## 2025-07-19 PROCEDURE — 83735 ASSAY OF MAGNESIUM: CPT

## 2025-07-19 PROCEDURE — 84295 ASSAY OF SERUM SODIUM: CPT

## 2025-07-19 PROCEDURE — 85018 HEMOGLOBIN: CPT

## 2025-07-19 PROCEDURE — 93010 ELECTROCARDIOGRAM REPORT: CPT

## 2025-07-19 PROCEDURE — 82803 BLOOD GASES ANY COMBINATION: CPT

## 2025-07-19 PROCEDURE — 82947 ASSAY GLUCOSE BLOOD QUANT: CPT

## 2025-07-19 PROCEDURE — 99284 EMERGENCY DEPT VISIT MOD MDM: CPT | Mod: 25

## 2025-07-19 PROCEDURE — 84132 ASSAY OF SERUM POTASSIUM: CPT

## 2025-07-19 PROCEDURE — 82330 ASSAY OF CALCIUM: CPT

## 2025-07-19 PROCEDURE — 36410 VNPNXR 3YR/> PHY/QHP DX/THER: CPT

## 2025-07-19 PROCEDURE — 85014 HEMATOCRIT: CPT

## 2025-07-19 PROCEDURE — 93005 ELECTROCARDIOGRAM TRACING: CPT

## 2025-07-19 PROCEDURE — 82435 ASSAY OF BLOOD CHLORIDE: CPT

## 2025-07-19 PROCEDURE — 99283 EMERGENCY DEPT VISIT LOW MDM: CPT | Mod: 25

## 2025-07-19 PROCEDURE — 80048 BASIC METABOLIC PNL TOTAL CA: CPT

## 2025-07-19 PROCEDURE — 80053 COMPREHEN METABOLIC PANEL: CPT

## 2025-07-19 NOTE — ED PROVIDER NOTE - PROGRESS NOTE DETAILS
Potassium was elevated and hemolyzed. Repeat bmp potassium normal. Rest of blood work including WBC and cr normal. Patient still asymptomatic on reassessment. Will discharge. -Didi Fields DO (PGY-2)-

## 2025-07-19 NOTE — ED ADULT NURSE REASSESSMENT NOTE - NS ED NURSE REASSESS COMMENT FT1
Assumed care of pt from LAWRENCE Gee. Pt in stretcher with comfortable appearance at this time. Vital signs are stable at this time. Safety and comfort measures maintained.

## 2025-07-19 NOTE — ED PROVIDER NOTE - NSFOLLOWUPINSTRUCTIONS_ED_ALL_ED_FT
You were seen in the emergency department for abnormal lab result. at outside facility. We have evaluated you and determined that you do not require further hospital interventions.    During your stay you had the following relevant results: Lab work was normal. WBC was 6.1 and creatinine was 0.81. Rest of lab work was also unremarkable    Please follow up with your primary care provider as necessary to discuss the results of your stay in our department.    If you start to experience worsening symptoms such as fevers, chills, chest pain, shortness of breath, abdominal pain, vomiting , please return to the emergency department for further evaluation.

## 2025-07-19 NOTE — ED PROVIDER NOTE - PATIENT PORTAL LINK FT
You can access the FollowMyHealth Patient Portal offered by [facility] by registering at the following website: [email or web address]. By joining [facility]’s FollowMyHealth portal, you will also be able to view your health information using other applications (apps) compatible with our system.

## 2025-07-19 NOTE — ED PROVIDER NOTE - CLINICAL SUMMARY MEDICAL DECISION MAKING FREE TEXT BOX
30 female with history of diabetes, bilateral cataract surgery, presenting for abnormal lab results - WBC 1.9, Cr 3.5, Patient denying symptoms. I on Ozempic. VSS. Unremarkable exam. Will repeat tests. 30 female with history of diabetes, bilateral cataract surgery, presenting for abnormal lab results - WBC 1.9, Cr 3.5, Patient denying symptoms. I on Ozempic. VSS. Unremarkable exam. Will repeat tests.    JANEL Rizzo MD: Agree with resident/ACP MDM, assessment and plan as above.

## 2025-07-19 NOTE — ED ADULT NURSE NOTE - OBJECTIVE STATEMENT
50 year old female no pertinent PMH presents to the ED sent in from PCP for abnormal labs on routine check up and lab draw - low white blood cell count.  Pt. has no complaints and is well appearing. 50 year old female no pertinent PMH presents to the ED sent in from PCP for abnormal labs on routine check up and lab draw - low white blood cell count and elevated.  Pt. has no complaints and is well appearing.  MD at bedside for assessment and discussion of plan of care. Patient undressed and placed into gown, call bell in hand and side rails up with bed in lowest position for safety. blanket provided. Comfort and safety provided.

## 2025-07-19 NOTE — ED PROVIDER NOTE - NSICDXPASTSURGICALHX_GEN_ALL_CORE_FT
PAST SURGICAL HISTORY:  C section 2008    S/P laparoscopic procedure D&C Hysteroscopy, Laparoscopy, RSO/ Excision Right Tubo-ovarian abscess 7/19

## 2025-07-19 NOTE — ED PROVIDER NOTE - PHYSICAL EXAMINATION
PHYSICAL EXAM:  GENERAL: NAD, lying in bed comfortably  HEAD:  Atraumatic, Normocephalic  EYES: EOMI, PERRLA, conjunctiva and sclera clear  ENT: No erythema/pallor/petechiae/lesions  NECK: Supple, No JVD  LUNG: CTA b/l; no r/r/w  HEART: RRR, +S1/S2; No m/r/g  ABDOMEN: soft, NT/ND; BS audible   EXTREMITIES:  2+ Peripheral Pulses, brisk cap refill.   NERVOUS SYSTEM:  AAOx3, speech clear. No sensory/motor deficits   SKIN: No rashes or lesions

## 2025-07-19 NOTE — ED PROVIDER NOTE - DIFFERENTIAL DIAGNOSIS
Ddx includes, however, is not limited to: dehydration, laboratory error, other Differential Diagnosis

## 2025-07-19 NOTE — ED PROVIDER NOTE - OBJECTIVE STATEMENT
30 female with history of diabetes, bilateral cataract surgery, presenting for abnormal lab results.  Patient reports visiting her PCP Dr. Surjit Quigley who performed routine tests which are concerning for white counts of 1.9, creatinine of 3.5, patient was asked to come to the ED for repeat testing.  Patient denies fevers, vision change, URI symptoms, chest pain, shortness of breath, nausea/vomiting, abdominal pain, urinary/bowel complaints. Patient is on Ozempic.

## 2025-07-19 NOTE — ED ADULT TRIAGE NOTE - CHIEF COMPLAINT QUOTE
Referred by PCP for low WBC and abnormal kidney function from outpatient labs.  Denies pain or discomfort at this time.

## 2025-07-24 NOTE — ED PROVIDER NOTE - NSICDXNOPASTSURGICALHX_GEN_ALL_ED
[FreeTextEntry1] : Patient Name: BRIAN REYNOLDS : Feb  3 1964 Date: 2025 Attending: Dr. Mikey Griffin   HPI: follow up appointment for bilateral VIN flap breast reconstruction    Allergies: NKDA Medication prescribed: aspirin 81 mg, valium 5 mg, percocet 5-325 mg   ROS: complete 14 point review of systems negative except pertinent items reviewed in the HPI. Other non-contributory items reviewed in our new patient questionnaire and we have submitted it to be scanned into the medical record.   Physical Exam: completed at time of in office evaluation   Assessment/Plan: We have discussed pre and postop instructions, recovery limitations, restrictions and expectations, ERAS protocol, NPO status, transportation home, postop medications, CARRIE drains, compression garments, benefits and risks of the procedure. Pre-op labs reviewed. CTA reviewed. The patient would like to proceed with surgery as scheduled.   DESTINEE MUSA NP   <-- Click to add NO significant Past Surgical History

## 2025-08-04 ENCOUNTER — APPOINTMENT (OUTPATIENT)
Dept: ORTHOPEDIC SURGERY | Facility: CLINIC | Age: 50
End: 2025-08-04

## 2025-08-20 ENCOUNTER — APPOINTMENT (OUTPATIENT)
Dept: ORTHOPEDIC SURGERY | Facility: CLINIC | Age: 50
End: 2025-08-20
Payer: MEDICAID

## 2025-08-20 VITALS — BODY MASS INDEX: 2.58 KG/M2 | HEIGHT: 62 IN | WEIGHT: 14 LBS

## 2025-08-20 DIAGNOSIS — M67.952 UNSPECIFIED DISORDER OF SYNOVIUM AND TENDON, LEFT THIGH: ICD-10-CM

## 2025-08-20 DIAGNOSIS — S76.312A STRAIN OF MUSCLE, FASCIA AND TENDON OF THE POSTERIOR MUSCLE GROUP AT THIGH LEVEL, LEFT THIGH, INITIAL ENCOUNTER: ICD-10-CM

## 2025-08-20 DIAGNOSIS — M67.951 UNSPECIFIED DISORDER OF SYNOVIUM AND TENDON, RIGHT THIGH: ICD-10-CM

## 2025-08-20 PROCEDURE — 99213 OFFICE O/P EST LOW 20 MIN: CPT
